# Patient Record
Sex: MALE | Race: WHITE | NOT HISPANIC OR LATINO | Employment: OTHER | URBAN - METROPOLITAN AREA
[De-identification: names, ages, dates, MRNs, and addresses within clinical notes are randomized per-mention and may not be internally consistent; named-entity substitution may affect disease eponyms.]

---

## 2019-09-25 ENCOUNTER — APPOINTMENT (EMERGENCY)
Dept: CT IMAGING | Facility: HOSPITAL | Age: 64
DRG: 206 | End: 2019-09-25
Payer: MEDICARE

## 2019-09-25 ENCOUNTER — APPOINTMENT (EMERGENCY)
Dept: RADIOLOGY | Facility: HOSPITAL | Age: 64
DRG: 206 | End: 2019-09-25
Payer: MEDICARE

## 2019-09-25 ENCOUNTER — HOSPITAL ENCOUNTER (INPATIENT)
Facility: HOSPITAL | Age: 64
LOS: 1 days | Discharge: HOME | DRG: 206 | End: 2019-09-27
Attending: STUDENT IN AN ORGANIZED HEALTH CARE EDUCATION/TRAINING PROGRAM | Admitting: STUDENT IN AN ORGANIZED HEALTH CARE EDUCATION/TRAINING PROGRAM
Payer: MEDICARE

## 2019-09-25 DIAGNOSIS — I10 HTN (HYPERTENSION): ICD-10-CM

## 2019-09-25 DIAGNOSIS — R07.9 CHEST PAIN, UNSPECIFIED TYPE: ICD-10-CM

## 2019-09-25 DIAGNOSIS — R07.89 ATYPICAL CHEST PAIN: Primary | ICD-10-CM

## 2019-09-25 PROBLEM — E87.6 HYPOKALEMIA: Status: ACTIVE | Noted: 2019-09-25

## 2019-09-25 LAB
ALBUMIN SERPL BCP-MCNC: 4.1 G/DL (ref 3.5–5)
ALP SERPL-CCNC: 56 U/L (ref 46–116)
ALT SERPL W P-5'-P-CCNC: 22 U/L (ref 12–78)
ANION GAP SERPL CALCULATED.3IONS-SCNC: 11 MMOL/L (ref 4–13)
AST SERPL W P-5'-P-CCNC: 18 U/L (ref 5–45)
BASOPHILS # BLD AUTO: 0.03 THOUSANDS/ΜL (ref 0–0.1)
BASOPHILS NFR BLD AUTO: 1 % (ref 0–1)
BILIRUB SERPL-MCNC: 0.7 MG/DL (ref 0.2–1)
BUN SERPL-MCNC: 23 MG/DL (ref 5–25)
CALCIUM SERPL-MCNC: 9 MG/DL (ref 8.3–10.1)
CHLORIDE SERPL-SCNC: 104 MMOL/L (ref 100–108)
CO2 SERPL-SCNC: 25 MMOL/L (ref 21–32)
CREAT SERPL-MCNC: 1.09 MG/DL (ref 0.6–1.3)
EOSINOPHIL # BLD AUTO: 0.09 THOUSAND/ΜL (ref 0–0.61)
EOSINOPHIL NFR BLD AUTO: 1 % (ref 0–6)
ERYTHROCYTE [DISTWIDTH] IN BLOOD BY AUTOMATED COUNT: 12.4 % (ref 11.6–15.1)
GFR SERPL CREATININE-BSD FRML MDRD: 71 ML/MIN/1.73SQ M
GLUCOSE SERPL-MCNC: 111 MG/DL (ref 65–140)
HCT VFR BLD AUTO: 46.8 % (ref 36.5–49.3)
HGB BLD-MCNC: 16.4 G/DL (ref 12–17)
IMM GRANULOCYTES # BLD AUTO: 0.03 THOUSAND/UL (ref 0–0.2)
IMM GRANULOCYTES NFR BLD AUTO: 1 % (ref 0–2)
LYMPHOCYTES # BLD AUTO: 1.56 THOUSANDS/ΜL (ref 0.6–4.47)
LYMPHOCYTES NFR BLD AUTO: 24 % (ref 14–44)
MCH RBC QN AUTO: 30.8 PG (ref 26.8–34.3)
MCHC RBC AUTO-ENTMCNC: 35 G/DL (ref 31.4–37.4)
MCV RBC AUTO: 88 FL (ref 82–98)
MONOCYTES # BLD AUTO: 0.83 THOUSAND/ΜL (ref 0.17–1.22)
MONOCYTES NFR BLD AUTO: 13 % (ref 4–12)
NEUTROPHILS # BLD AUTO: 4.07 THOUSANDS/ΜL (ref 1.85–7.62)
NEUTS SEG NFR BLD AUTO: 60 % (ref 43–75)
NRBC BLD AUTO-RTO: 0 /100 WBCS
PLATELET # BLD AUTO: 164 THOUSANDS/UL (ref 149–390)
PMV BLD AUTO: 9.6 FL (ref 8.9–12.7)
POTASSIUM SERPL-SCNC: 3.4 MMOL/L (ref 3.5–5.3)
PROT SERPL-MCNC: 8.1 G/DL (ref 6.4–8.2)
RBC # BLD AUTO: 5.32 MILLION/UL (ref 3.88–5.62)
SODIUM SERPL-SCNC: 140 MMOL/L (ref 136–145)
TROPONIN I SERPL-MCNC: <0.02 NG/ML
TROPONIN I SERPL-MCNC: <0.02 NG/ML
WBC # BLD AUTO: 6.61 THOUSAND/UL (ref 4.31–10.16)

## 2019-09-25 PROCEDURE — 99285 EMERGENCY DEPT VISIT HI MDM: CPT

## 2019-09-25 PROCEDURE — 85025 COMPLETE CBC W/AUTO DIFF WBC: CPT | Performed by: NURSE PRACTITIONER

## 2019-09-25 PROCEDURE — 93005 ELECTROCARDIOGRAM TRACING: CPT

## 2019-09-25 PROCEDURE — 80053 COMPREHEN METABOLIC PANEL: CPT | Performed by: NURSE PRACTITIONER

## 2019-09-25 PROCEDURE — 74174 CTA ABD&PLVS W/CONTRAST: CPT

## 2019-09-25 PROCEDURE — 83036 HEMOGLOBIN GLYCOSYLATED A1C: CPT | Performed by: NURSE PRACTITIONER

## 2019-09-25 PROCEDURE — 1124F ACP DISCUSS-NO DSCNMKR DOCD: CPT | Performed by: INTERNAL MEDICINE

## 2019-09-25 PROCEDURE — 84484 ASSAY OF TROPONIN QUANT: CPT | Performed by: NURSE PRACTITIONER

## 2019-09-25 PROCEDURE — 36415 COLL VENOUS BLD VENIPUNCTURE: CPT | Performed by: NURSE PRACTITIONER

## 2019-09-25 PROCEDURE — 99285 EMERGENCY DEPT VISIT HI MDM: CPT | Performed by: NURSE PRACTITIONER

## 2019-09-25 PROCEDURE — 71275 CT ANGIOGRAPHY CHEST: CPT

## 2019-09-25 PROCEDURE — 99220 PR INITIAL OBSERVATION CARE/DAY 70 MINUTES: CPT | Performed by: STUDENT IN AN ORGANIZED HEALTH CARE EDUCATION/TRAINING PROGRAM

## 2019-09-25 PROCEDURE — 71046 X-RAY EXAM CHEST 2 VIEWS: CPT

## 2019-09-25 RX ORDER — NITROGLYCERIN 0.4 MG/1
0.4 TABLET SUBLINGUAL
Status: DISCONTINUED | OUTPATIENT
Start: 2019-09-25 | End: 2019-09-27 | Stop reason: HOSPADM

## 2019-09-25 RX ORDER — NAPROXEN 250 MG/1
500 TABLET ORAL 2 TIMES DAILY PRN
Status: DISCONTINUED | OUTPATIENT
Start: 2019-09-25 | End: 2019-09-27 | Stop reason: HOSPADM

## 2019-09-25 RX ORDER — AMLODIPINE BESYLATE 5 MG/1
5 TABLET ORAL 2 TIMES DAILY
Status: DISCONTINUED | OUTPATIENT
Start: 2019-09-25 | End: 2019-09-27 | Stop reason: HOSPADM

## 2019-09-25 RX ORDER — DOCUSATE SODIUM 100 MG/1
100 CAPSULE, LIQUID FILLED ORAL 2 TIMES DAILY
Status: DISCONTINUED | OUTPATIENT
Start: 2019-09-25 | End: 2019-09-27 | Stop reason: HOSPADM

## 2019-09-25 RX ORDER — ATORVASTATIN CALCIUM 40 MG/1
40 TABLET, FILM COATED ORAL EVERY EVENING
Status: DISCONTINUED | OUTPATIENT
Start: 2019-09-25 | End: 2019-09-27 | Stop reason: HOSPADM

## 2019-09-25 RX ORDER — HYDROCHLOROTHIAZIDE 12.5 MG/1
12.5 TABLET ORAL DAILY
Status: DISCONTINUED | OUTPATIENT
Start: 2019-09-26 | End: 2019-09-27 | Stop reason: HOSPADM

## 2019-09-25 RX ORDER — CALCIUM CARBONATE 200(500)MG
1000 TABLET,CHEWABLE ORAL DAILY PRN
Status: DISCONTINUED | OUTPATIENT
Start: 2019-09-25 | End: 2019-09-27 | Stop reason: HOSPADM

## 2019-09-25 RX ORDER — ONDANSETRON 2 MG/ML
4 INJECTION INTRAMUSCULAR; INTRAVENOUS EVERY 6 HOURS PRN
Status: DISCONTINUED | OUTPATIENT
Start: 2019-09-25 | End: 2019-09-27 | Stop reason: HOSPADM

## 2019-09-25 RX ORDER — ACETAMINOPHEN 325 MG/1
650 TABLET ORAL EVERY 6 HOURS PRN
Status: DISCONTINUED | OUTPATIENT
Start: 2019-09-25 | End: 2019-09-27 | Stop reason: HOSPADM

## 2019-09-25 RX ORDER — ASPIRIN 81 MG/1
81 TABLET ORAL DAILY
Status: DISCONTINUED | OUTPATIENT
Start: 2019-09-26 | End: 2019-09-25

## 2019-09-25 RX ORDER — NAPROXEN 500 MG/1
500 TABLET ORAL 2 TIMES DAILY PRN
Status: ON HOLD | COMMUNITY
End: 2019-09-27 | Stop reason: SDUPTHER

## 2019-09-25 RX ORDER — TRAMADOL HYDROCHLORIDE 50 MG/1
50 TABLET ORAL EVERY 6 HOURS PRN
Status: DISCONTINUED | OUTPATIENT
Start: 2019-09-25 | End: 2019-09-27 | Stop reason: HOSPADM

## 2019-09-25 RX ORDER — AMLODIPINE BESYLATE 5 MG/1
5 TABLET ORAL 2 TIMES DAILY
Status: ON HOLD | COMMUNITY
End: 2019-09-27 | Stop reason: SDUPTHER

## 2019-09-25 RX ORDER — ASPIRIN 81 MG/1
81 TABLET, CHEWABLE ORAL DAILY
Status: DISCONTINUED | OUTPATIENT
Start: 2019-09-26 | End: 2019-09-27 | Stop reason: HOSPADM

## 2019-09-25 RX ORDER — CARVEDILOL 6.25 MG/1
6.25 TABLET ORAL 2 TIMES DAILY WITH MEALS
Status: DISCONTINUED | OUTPATIENT
Start: 2019-09-25 | End: 2019-09-25

## 2019-09-25 RX ORDER — CARVEDILOL 6.25 MG/1
6.25 TABLET ORAL ONCE
Status: COMPLETED | OUTPATIENT
Start: 2019-09-25 | End: 2019-09-25

## 2019-09-25 RX ORDER — POTASSIUM CHLORIDE 20 MEQ/1
40 TABLET, EXTENDED RELEASE ORAL ONCE
Status: COMPLETED | OUTPATIENT
Start: 2019-09-25 | End: 2019-09-25

## 2019-09-25 RX ORDER — CARVEDILOL 6.25 MG/1
6.25 TABLET ORAL 2 TIMES DAILY WITH MEALS
Status: ON HOLD | COMMUNITY
End: 2019-09-27 | Stop reason: SDUPTHER

## 2019-09-25 RX ORDER — 0.9 % SODIUM CHLORIDE 0.9 %
3 VIAL (ML) INJECTION AS NEEDED
Status: DISCONTINUED | OUTPATIENT
Start: 2019-09-25 | End: 2019-09-27 | Stop reason: HOSPADM

## 2019-09-25 RX ORDER — HYDROCHLOROTHIAZIDE 12.5 MG/1
12.5 CAPSULE, GELATIN COATED ORAL DAILY
Status: ON HOLD | COMMUNITY
End: 2019-09-27 | Stop reason: SDUPTHER

## 2019-09-25 RX ORDER — ASPIRIN 81 MG/1
81 TABLET ORAL DAILY
COMMUNITY

## 2019-09-25 RX ORDER — TRAMADOL HYDROCHLORIDE 50 MG/1
50 TABLET ORAL EVERY 6 HOURS PRN
COMMUNITY

## 2019-09-25 RX ADMIN — DOCUSATE SODIUM 100 MG: 100 CAPSULE, LIQUID FILLED ORAL at 20:32

## 2019-09-25 RX ADMIN — METOPROLOL TARTRATE 25 MG: 25 TABLET, FILM COATED ORAL at 20:32

## 2019-09-25 RX ADMIN — POTASSIUM CHLORIDE 40 MEQ: 1500 TABLET, EXTENDED RELEASE ORAL at 20:32

## 2019-09-25 RX ADMIN — CARVEDILOL 6.25 MG: 6.25 TABLET, FILM COATED ORAL at 15:16

## 2019-09-25 RX ADMIN — IOHEXOL 100 ML: 350 INJECTION, SOLUTION INTRAVENOUS at 16:31

## 2019-09-25 RX ADMIN — ATORVASTATIN CALCIUM 40 MG: 40 TABLET, FILM COATED ORAL at 20:32

## 2019-09-25 RX ADMIN — AMLODIPINE BESYLATE 5 MG: 5 TABLET ORAL at 20:32

## 2019-09-25 RX ADMIN — TRAMADOL HYDROCHLORIDE 50 MG: 50 TABLET, FILM COATED ORAL at 20:31

## 2019-09-25 NOTE — PLAN OF CARE
Problem: PAIN - ADULT  Goal: Verbalizes/displays adequate comfort level or baseline comfort level  Description  Interventions:  - Encourage patient to monitor pain and request assistance  - Assess pain using appropriate pain scale  - Administer analgesics based on type and severity of pain and evaluate response  - Implement non-pharmacological measures as appropriate and evaluate response  - Consider cultural and social influences on pain and pain management  - Notify physician/advanced practitioner if interventions unsuccessful or patient reports new pain  Outcome: Not Progressing     Problem: INFECTION - ADULT  Goal: Absence or prevention of progression during hospitalization  Description  INTERVENTIONS:  - Assess and monitor for signs and symptoms of infection  - Monitor lab/diagnostic results  - Monitor all insertion sites, i e  indwelling lines, tubes, and drains  - Monitor endotracheal if appropriate and nasal secretions for changes in amount and color  - [place] appropriate cooling/warming therapies per order  - Administer medications as ordered  - Instruct and encourage patient and family to use good hand hygiene technique  - Identify and instruct in appropriate isolation precautions for identified infection/condition  Outcome: Not Progressing     Problem: SAFETY ADULT  Goal: Patient will remain free of falls  Description  INTERVENTIONS:  - Assess patient frequently for physical needs  -  Identify cognitive and physical deficits and behaviors that affect risk of falls    -  [place] fall precautions as indicated by assessment   - Educate patient/family on patient safety including physical limitations  - Instruct patient to call for assistance with activity based on assessment  - Modify environment to reduce risk of injury  - Consider OT/PT consult to assist with strengthening/mobility  Outcome: Not Progressing  Goal: Maintain or return to baseline ADL function  Description  INTERVENTIONS:  -  Assess patient's ability to carry out ADLs; assess patient's baseline for ADL function and identify physical deficits which impact ability to perform ADLs (bathing, care of mouth/teeth, toileting, grooming, dressing, etc )  - Assess/evaluate cause of self-care deficits   - Assess range of motion  - Assess patient's mobility; develop plan if impaired  - Assess patient's need for assistive devices and provide as appropriate  - Encourage maximum independence but intervene and supervise when necessary  - Involve family in performance of ADLs  - Assess for home care needs following discharge   - Consider OT consult to assist with ADL evaluation and planning for discharge  - Provide patient education as appropriate  Outcome: Not Progressing  Goal: Maintain or return mobility status to optimal level  Description  INTERVENTIONS:  - Assess patient's baseline mobility status (ambulation, transfers, stairs, etc )    - Identify cognitive and physical deficits and behaviors that affect mobility  - Identify mobility aids required to assist with transfers and/or ambulation (gait belt, sit-to-stand, lift, walker, cane, etc )  - [place] fall precautions as indicated by assessment  - Record patient progress and toleration of activity level on Mobility SBAR; progress patient to next Phase/Stage  - Instruct patient to call for assistance with activity based on assessment  - Consider rehabilitation consult to assist with strengthening/weightbearing, etc   Outcome: Not Progressing     Problem: DISCHARGE PLANNING  Goal: Discharge to home or other facility with appropriate resources  Description  INTERVENTIONS:  - Identify barriers to discharge w/patient and caregiver  - Arrange for needed discharge resources and transportation as appropriate  - Identify discharge learning needs (meds, wound care, etc )  - Arrange for interpretive services to assist at discharge as needed  - Refer to Case Management Department for coordinating discharge planning if the patient needs post-hospital services based on physician/advanced practitioner order or complex needs related to functional status, cognitive ability, or social support system  Outcome: Not Progressing     Problem: Knowledge Deficit  Goal: Patient/family/caregiver demonstrates understanding of disease process, treatment plan, medications, and discharge instructions  Description  Complete learning assessment and assess knowledge base    Interventions:  - Provide teaching at level of understanding  - Provide teaching via preferred learning methods  Outcome: Not Progressing

## 2019-09-25 NOTE — ASSESSMENT & PLAN NOTE
Left-sided back, arm pain and chest pressure over the last 3-4 days  Cardiology consult  Telemetry  Initial troponin negative continue to trend  EKGs with troponins  Cardiac diet  Metoprolol aspirin statin  Lipid panel hemoglobin A1c  P r n   Nitro

## 2019-09-25 NOTE — LETTER
[address] [address]  Dept: [phone]    September 27, 2019     Patient: Coral Ornelas   YOB: 1955   Date of Visit: 9/25/2019       To Whom it May Concern:    Jamar Jackson was under my professional care  He was seen in the hospital from 9/25/2019   to 09/27/19  If you have any questions or concerns, please don't hesitate to call           Sincerely,          Sonal Francisco MD

## 2019-09-25 NOTE — H&P
[facility]  H&P- Jazzytayna Duartebaldemar 1955, 71 y o  male MRN: [id number]    Unit/Bed#: -01 Encounter: [id number]    Primary Care Provider: No primary care provider on file  Date and time admitted to hospital: 9/25/2019  2:04 PM  * Chest pain  Assessment & Plan  Left-sided back, arm pain and chest pressure over the last 3-4 days  Cardiology consult  Telemetry  Initial troponin negative continue to trend  EKGs with troponins  Cardiac diet  Metoprolol aspirin statin  Lipid panel hemoglobin A1c  P r n  Nitro    Hypokalemia  Assessment & Plan  3 4 on admission, mild  Oral repletion  BMP in a m  VTE Prophylaxis: Enoxaparin (Lovenox)  / sequential compression device   Code Status:  Full code  POLST: POLST form is not discussed and not completed at this time  Discussion with family:  Not available    Anticipated Length of Stay:  Patient will be admitted on an Observation basis with an anticipated length of stay of  < 2 midnights  Justification for Hospital Stay:  Evaluation of chest pain, echocardiogram, trend troponins    Total Time for Visit, including Counseling / Coordination of Care: 1 hour  Greater than 50% of this total time spent on direct patient counseling and coordination of care  Chief Complaint:   Chest pain    History of Present Illness:     hest pain  Patient reports chest pressor with left back and left arm pain over the last 3-4 days  He reports nausea and diaphoresis associated with the pain as well shortness of breath  He states that he had open-heart surgery approximately 10 years ago at [facility]   He sees a cardiologist every 6 months and gets an echocardiogram he believes yearly however he reports that he has not had 1 in at least a year  He denies any alcohol cigarettes or drug use  He ambulates with cane  He does report that he missed his carvedilol for the last    Review of Systems:    Review of Systems   Constitutional: Negative      HENT: Negative  Respiratory: Positive for shortness of breath  Cardiovascular: Positive for chest pain  Gastrointestinal: Positive for nausea  Negative for abdominal pain and vomiting  Genitourinary: Negative  Musculoskeletal: Negative  Neurological: Negative  Hematological: Negative  Psychiatric/Behavioral: Negative  All other systems reviewed and are negative  Past Medical and Surgical History:     No past medical history on file  No past surgical history on file  Meds/Allergies:    Prior to Admission medications    Medication Sig Start Date End Date Taking? Authorizing Provider   amLODIPine (NORVASC) 5 mg tablet Take 5 mg by mouth 2 (two) times a day   Yes Historical Provider, MD   aspirin (ECOTRIN LOW STRENGTH) 81 mg EC tablet Take 81 mg by mouth daily   Yes Historical Provider, MD   carvedilol (COREG) 6 25 mg tablet Take 6 25 mg by mouth 2 (two) times a day with meals   Yes Historical Provider, MD   hydrochlorothiazide (MICROZIDE) 12 5 mg capsule Take 12 5 mg by mouth daily   Yes Historical Provider, MD   naproxen (NAPROSYN) 500 mg tablet Take 500 mg by mouth 2 (two) times a day as needed for mild pain   Yes Historical Provider, MD   traMADol (ULTRAM) 50 mg tablet Take 50 mg by mouth every 6 (six) hours as needed for moderate pain   Yes Historical Provider, MD     I have reviewed home medications with patient personally  Allergies:    Allergies   Allergen Reactions    Penicillins Anxiety       Social History:     Marital Status: /Civil Union   Occupation:  NA  Patient Pre-hospital Living Situation:  Private residence  Patient Pre-hospital Level of Mobility:  Independent with the use of cane  Patient Pre-hospital Diet Restrictions:  None  Substance Use History:   Social History     Substance and Sexual Activity   Alcohol Use Never    Frequency: Never    Binge frequency: Never     Social History     Tobacco Use   Smoking Status Not on file     Social History     Substance and Sexual Activity   Drug Use Not on file       Family History:    No family history on file  Physical Exam:     Vitals:   Blood Pressure: 130/58 (09/25/19 1745)  Pulse: 60 (09/25/19 1745)  Temperature: 97 8 °F (36 6 °C) (09/25/19 1357)  Temp Source: Oral (09/25/19 1357)  Respirations: 15 (09/25/19 1745)  Height: 5' 7" (170 2 cm) (09/25/19 1357)  Weight - Scale: 104 kg (229 lb 15 oz) (09/25/19 1357)  SpO2: 96 % (09/25/19 1745)    Physical Exam   Constitutional: He is oriented to person, place, and time  He appears well-developed and well-nourished  He appears distressed  HENT:   Head: Normocephalic  Eyes: Pupils are equal, round, and reactive to light  Neck: Normal range of motion  Neck supple  Cardiovascular: Normal rate and regular rhythm  Pulmonary/Chest: Effort normal    Abdominal: Soft  Bowel sounds are normal  He exhibits no distension  Musculoskeletal: Normal range of motion  He exhibits no tenderness  Neurological: He is alert and oriented to person, place, and time  Skin: Skin is warm  He is diaphoretic  No pallor  Psychiatric: He has a normal mood and affect  His behavior is normal  Judgment and thought content normal    Nursing note and vitals reviewed  Additional Data:     Lab Results: I have personally reviewed pertinent reports  Results from last 7 days   Lab Units 09/25/19  1437   WBC Thousand/uL 6 61   HEMOGLOBIN g/dL 16 4   HEMATOCRIT % 46 8   PLATELETS Thousands/uL 164   NEUTROS PCT % 60   LYMPHS PCT % 24   MONOS PCT % 13*   EOS PCT % 1     Results from last 7 days   Lab Units 09/25/19  1437   SODIUM mmol/L 140   POTASSIUM mmol/L 3 4*   CHLORIDE mmol/L 104   CO2 mmol/L 25   BUN mg/dL 23   CREATININE mg/dL 1 09   ANION GAP mmol/L 11   CALCIUM mg/dL 9 0   ALBUMIN g/dL 4 1   TOTAL BILIRUBIN mg/dL 0 70   ALK PHOS U/L 56   ALT U/L 22   AST U/L 18   GLUCOSE RANDOM mg/dL 111                       Imaging: I have personally reviewed pertinent reports        CTA dissection protocol chest abdomen pelvis w wo contrast   Final Result by Aimee Ashraf MD (09/25 5748)      1  No evidence for aortic dissection or aneurysm  2   Hepatomegaly  Cholelithiasis  3   Mild hydronephrosis on the left  Slight fullness of the collecting system on the right  This is probably related to the distended urinary bladder  4    2 9 x 1 0 cm ovoid soft tissue density just posterior to the umbilical region on the right, image 180 series 3  Question related to prior surgery  If there is no history of surgery, consider follow-up CT examination in 6-12 months to ensure    stability  Workstation performed: [id number]         X-ray chest 2 views   ED Interpretation by TASNEEM Souza (09/25 1443)   No acute cardiopulmonary findings      Final Result by Elida Guardado MD (09/25 1530)      No acute cardiopulmonary disease  Workstation performed: [id number]             EKG, Pathology, and Other Studies Reviewed on Admission:   · EKG:  Not available    Allscripts / Epic Records Reviewed: Yes     ** Please Note: This note has been constructed using a voice recognition system   **

## 2019-09-25 NOTE — ED PROVIDER NOTES
History  Chief Complaint   Patient presents with    Back Pain     pt c/o upper back pain that radiates to left arm and to chest "for the past couple weeks" pt has hx of MI, open heart surgery, HTN    Chest Pain     pt denies chest pain at this time  pt states "it is intermittent radiating to my chest from my back"     This is a 43-year-old male patient presents here for chief complaint of upper back pain that radiates to his left arm and also to his chest   He reports he has had the symptoms for about the last week  The upper back pain is constant the chest pain has been intermittent  Reports an episode on Monday that lasted a few minutes while he was at work and then he rested and it resolved  He attributes this to his high blood pressure and lack of Coreg  he Reports he ran out of the medication and has tried to contact his cardiologist who was unavailable from the [place] area although he lives here  He has had a few other random episodes not related to exertion since that time  Episodes only last a few minutes  Denies any nausea vomiting  He is sweaty at times  Wakes up drenched reportedly  Denies any nausea vomiting  No dizziness  Prior to Admission Medications   Prescriptions Last Dose Informant Patient Reported? Taking?    amLODIPine (NORVASC) 5 mg tablet 9/25/2019 at Unknown time  Yes Yes   Sig: Take 5 mg by mouth 2 (two) times a day   aspirin (ECOTRIN LOW STRENGTH) 81 mg EC tablet 9/25/2019 at Unknown time Self Yes Yes   Sig: Take 81 mg by mouth daily   carvedilol (COREG) 6 25 mg tablet Past Week at Unknown time Self Yes Yes   Sig: Take 6 25 mg by mouth 2 (two) times a day with meals   hydrochlorothiazide (MICROZIDE) 12 5 mg capsule 9/25/2019 at Unknown time Self Yes Yes   Sig: Take 12 5 mg by mouth daily   naproxen (NAPROSYN) 500 mg tablet Past Month at Unknown time Self Yes Yes   Sig: Take 500 mg by mouth 2 (two) times a day as needed for mild pain   traMADol (ULTRAM) 50 mg tablet Past Month at Unknown time  Yes Yes   Sig: Take 50 mg by mouth every 6 (six) hours as needed for moderate pain      Facility-Administered Medications: None       No past medical history on file  No past surgical history on file  No family history on file  I have reviewed and agree with the history as documented  Social History     Tobacco Use    Smoking status: Not on file   Substance Use Topics    Alcohol use: Never     Frequency: Never     Binge frequency: Never    Drug use: Not on file        Review of Systems   Constitutional: Negative for diaphoresis, fatigue and fever  HENT: Negative for congestion, ear pain, nosebleeds and sore throat  Eyes: Negative for photophobia, pain, discharge and visual disturbance  Respiratory: Negative for cough, choking, chest tightness, shortness of breath and wheezing  Cardiovascular: Positive for chest pain  Negative for palpitations  Gastrointestinal: Negative for abdominal distention, abdominal pain, diarrhea and vomiting  Genitourinary: Negative for dysuria, flank pain and frequency  Musculoskeletal: Positive for back pain  Negative for gait problem and joint swelling  Skin: Negative for color change and rash  Neurological: Negative for dizziness, syncope and headaches  Psychiatric/Behavioral: Negative for behavioral problems and confusion  The patient is not nervous/anxious  All other systems reviewed and are negative  Physical Exam  Physical Exam   Constitutional: He is oriented to person, place, and time  He appears well-developed and well-nourished  HENT:   Head: Normocephalic and atraumatic  Eyes: Pupils are equal, round, and reactive to light  Neck: Normal range of motion  Neck supple  Cardiovascular: Normal rate, regular rhythm, normal heart sounds and normal pulses  PMI is not displaced  Pulmonary/Chest: Effort normal and breath sounds normal  No respiratory distress  Abdominal: Soft  He exhibits no distension   There is no guarding  Musculoskeletal: Normal range of motion  Lymphadenopathy:     He has no cervical adenopathy  Neurological: He is alert and oriented to person, place, and time  Skin: Skin is warm and dry  No rash noted  He is not diaphoretic  No pallor  Psychiatric: He has a normal mood and affect  Vitals reviewed        Vital Signs  ED Triage Vitals [09/25/19 1357]   Temperature Pulse Respirations Blood Pressure SpO2   97 8 °F (36 6 °C) 74 17 (!) 193/117 98 %      Temp Source Heart Rate Source Patient Position - Orthostatic VS BP Location FiO2 (%)   Oral Monitor Sitting Left arm --      Pain Score       7           Vitals:    09/25/19 1530 09/25/19 1600 09/25/19 1745 09/25/19 1926   BP: 150/93 141/95 130/58 153/95   Pulse: 62 61 60 58   Patient Position - Orthostatic VS: Sitting Sitting Lying          Visual Acuity      ED Medications  Medications   sodium chloride (PF) 0 9 % injection 3 mL (has no administration in time range)   amLODIPine (NORVASC) tablet 5 mg (5 mg Oral Given 9/25/19 2032)   hydrochlorothiazide (HYDRODIURIL) tablet 12 5 mg (has no administration in time range)   traMADol (ULTRAM) tablet 50 mg (50 mg Oral Given 9/25/19 2031)   naproxen (NAPROSYN) tablet 500 mg (has no administration in time range)   docusate sodium (COLACE) capsule 100 mg (100 mg Oral Given 9/25/19 2032)   ondansetron (ZOFRAN) injection 4 mg (has no administration in time range)   calcium carbonate (TUMS) chewable tablet 1,000 mg (has no administration in time range)   atorvastatin (LIPITOR) tablet 40 mg (40 mg Oral Given 9/25/19 2032)   nitroglycerin (NITROSTAT) SL tablet 0 4 mg (has no administration in time range)   aspirin chewable tablet 81 mg (has no administration in time range)   enoxaparin (LOVENOX) subcutaneous injection 40 mg (has no administration in time range)   acetaminophen (TYLENOL) tablet 650 mg (has no administration in time range)   metoprolol tartrate (LOPRESSOR) tablet 25 mg (25 mg Oral Given 9/25/19 2032)   carvedilol (COREG) tablet 6 25 mg (6 25 mg Oral Given 9/25/19 1516)   iohexol (OMNIPAQUE) 350 MG/ML injection (MULTI-DOSE) 100 mL (100 mL Intravenous Given 9/25/19 1631)   potassium chloride (K-DUR,KLOR-CON) CR tablet 40 mEq (40 mEq Oral Given 9/25/19 2032)       Diagnostic Studies  Results Reviewed     Procedure Component Value Units Date/Time    Troponin I [[id number]]  (Normal) Collected:  09/25/19 2114    Lab Status:  Final result Specimen:  Blood from Arm, Left Updated:  09/25/19 2150     Troponin I <0 02 ng/mL     Hemoglobin A1C w/ EAG Estimation [[id number]] Collected:  09/25/19 1437    Lab Status:   In process Specimen:  Blood from Arm, Right Updated:  09/25/19 2044    Troponin I [[id number]]     Lab Status:  No result Specimen:  Blood     Comprehensive metabolic panel [[id number]]  (Abnormal) Collected:  09/25/19 1437    Lab Status:  Final result Specimen:  Blood from Arm, Right Updated:  09/25/19 1507     Sodium 140 mmol/L      Potassium 3 4 mmol/L      Chloride 104 mmol/L      CO2 25 mmol/L      ANION GAP 11 mmol/L      BUN 23 mg/dL      Creatinine 1 09 mg/dL      Glucose 111 mg/dL      Calcium 9 0 mg/dL      AST 18 U/L      ALT 22 U/L      Alkaline Phosphatase 56 U/L      Total Protein 8 1 g/dL      Albumin 4 1 g/dL      Total Bilirubin 0 70 mg/dL      eGFR 71 ml/min/1 73sq m     Narrative:       [place] guidelines for Chronic Kidney Disease (CKD):     Stage 1 with normal or high GFR (GFR > 90 mL/min/1 73 square meters)    Stage 2 Mild CKD (GFR = 60-89 mL/min/1 73 square meters)    Stage 3A Moderate CKD (GFR = 45-59 mL/min/1 73 square meters)    Stage 3B Moderate CKD (GFR = 30-44 mL/min/1 73 square meters)    Stage 4 Severe CKD (GFR = 15-29 mL/min/1 73 square meters)    Stage 5 End Stage CKD (GFR <15 mL/min/1 73 square meters)  Note: GFR calculation is accurate only with a steady state creatinine    Troponin I [[id number]]  (Normal) Collected:  09/25/19 1437    Lab Status:  Final result Specimen:  Blood from Arm, Right Updated:  09/25/19 1505     Troponin I <0 02 ng/mL     CBC and differential [[id number]]  (Abnormal) Collected:  09/25/19 1437    Lab Status:  Final result Specimen:  Blood from Arm, Right Updated:  09/25/19 1444     WBC 6 61 Thousand/uL      RBC 5 32 Million/uL      Hemoglobin 16 4 g/dL      Hematocrit 46 8 %      MCV 88 fL      MCH 30 8 pg      MCHC 35 0 g/dL      RDW 12 4 %      MPV 9 6 fL      Platelets 130 Thousands/uL      nRBC 0 /100 WBCs      Neutrophils Relative 60 %      Immat GRANS % 1 %      Lymphocytes Relative 24 %      Monocytes Relative 13 %      Eosinophils Relative 1 %      Basophils Relative 1 %      Neutrophils Absolute 4 07 Thousands/µL      Immature Grans Absolute 0 03 Thousand/uL      Lymphocytes Absolute 1 56 Thousands/µL      Monocytes Absolute 0 83 Thousand/µL      Eosinophils Absolute 0 09 Thousand/µL      Basophils Absolute 0 03 Thousands/µL                  CTA dissection protocol chest abdomen pelvis w wo contrast   Final Result by Damian Lopez MD (09/25 0128)      1  No evidence for aortic dissection or aneurysm  2   Hepatomegaly  Cholelithiasis  3   Mild hydronephrosis on the left  Slight fullness of the collecting system on the right  This is probably related to the distended urinary bladder  4    2 9 x 1 0 cm ovoid soft tissue density just posterior to the umbilical region on the right, image 180 series 3  Question related to prior surgery  If there is no history of surgery, consider follow-up CT examination in 6-12 months to ensure    stability  Workstation performed: [id number]         X-ray chest 2 views   ED Interpretation by TASNEEM Albarran (09/25 1446)   No acute cardiopulmonary findings      Final Result by Erik Nieves MD (09/25 1530)      No acute cardiopulmonary disease        Workstation performed: [id number]                    Procedures  ECG 12 Lead Documentation Only  Date/Time: 9/25/2019 2:45 PM  Performed by: TASNEEM Ortega  Authorized by: TASNEEM Ortega     Indications / Diagnosis:  SR  ECG reviewed by me, the ED Provider: yes    Patient location:  ED  Previous ECG:     Previous ECG:  Unavailable  Interpretation:     Interpretation: normal    Rate:     ECG rate:  69    ECG rate assessment: normal    Rhythm:     Rhythm: sinus rhythm    Ectopy:     Ectopy: none    QRS:     QRS axis:  Normal  Conduction:     Conduction: normal    ST segments:     ST segments:  Normal  T waves:     T waves: normal             ED Course         HEART Risk Score      Most Recent Value   History  2 Filed at: 09/25/2019 1543   ECG  1 Filed at: 09/25/2019 1543   Age  2 Filed at: 09/25/2019 1543   Risk Factors  2 Filed at: 09/25/2019 1543   Troponin  0 Filed at: 09/25/2019 1543   Heart Score Risk Calculator   History  2 Filed at: 09/25/2019 1543   ECG  1 Filed at: 09/25/2019 1543   Age  2 Filed at: 09/25/2019 1543   Risk Factors  2 Filed at: 09/25/2019 1543   Troponin  0 Filed at: 09/25/2019 1543   HEART Score  7 Filed at: 09/25/2019 1543   HEART Score  7 Filed at: 09/25/2019 1543        Identification of Seniors at Risk      Most Recent Value   (ISAR) Identification of Seniors at Risk   Before the illness or injury that brought you to the Emergency, did you need someone to help you on a regular basis? 0 Filed at: 09/25/2019 1400   In the last 24 hours, have you needed more help than usual?  0 Filed at: 09/25/2019 1400   Have you been hospitalized for one or more nights during the past 6 months? 0 Filed at: 09/25/2019 1400   In general, do you see well?  0 Filed at: 09/25/2019 1400   In general, do you have serious problems with your memory? 0 Filed at: 09/25/2019 1400   Do you take more than three different medications every day?   1 Filed at: 09/25/2019 1400   ISAR Score  1 Filed at: 09/25/2019 1400                  VARUN Risk Score      Most Recent Value   Age >= 72  1 Filed at: 09/25/2019 1933   Known CAD (stenosis >= 50%)  0 Filed at: 09/25/2019 1933   Recent (<=24 hrs) Service Angina  1 Filed at: 09/25/2019 1933   ST Deviation >= 0 5 mm  0 Filed at: 09/25/2019 1933   3+ CAD Risk Factors (FHx, HTN, HLP, DM, Smoker)  0 Filed at: 09/25/2019 1933   Aspirin Use Past 7 Days  0 Filed at: 09/25/2019 1933   Elevated Cardiac Markers  0 Filed at: 09/25/2019 1933   VARUN Risk Score (Calculated)  2 Filed at: 09/25/2019 1933              MDM  Number of Diagnoses or Management Options  Atypical chest pain: new and requires workup  HTN (hypertension): new and requires workup  Diagnosis management comments: Concerning history, heart score moderate severe risk  Will bring in for cardiac ups       Amount and/or Complexity of Data Reviewed  Clinical lab tests: reviewed and ordered  Tests in the radiology section of CPT®: reviewed and ordered  Tests in the medicine section of CPT®: reviewed and ordered  Review and summarize past medical records: yes  Discuss the patient with other providers: yes  Independent visualization of images, tracings, or specimens: yes    Patient Progress  Patient progress: stable      Disposition  Final diagnoses:   Atypical chest pain   HTN (hypertension)     Time reflects when diagnosis was documented in both MDM as applicable and the Disposition within this note     Time User Action Codes Description Comment    9/25/2019  3:44 PM Jose Heller Add [R07 89] Atypical chest pain     9/25/2019  3:44 PM Jose Heller Add [I10] HTN (hypertension)       ED Disposition     ED Disposition Condition Date/Time Comment    Admit Stable Wed Sep 25, 2019  5:57 PM Case was discussed with Annabelle Gomez and the patient's admission status was agreed to be Admission Status: observation status to the service of Dr Rajiv Shearer           Follow-up Information    None         Current Discharge Medication List      CONTINUE these medications which have NOT CHANGED    Details   amLODIPine (NORVASC) 5 mg tablet Take 5 mg by mouth 2 (two) times a day      aspirin (ECOTRIN LOW STRENGTH) 81 mg EC tablet Take 81 mg by mouth daily      carvedilol (COREG) 6 25 mg tablet Take 6 25 mg by mouth 2 (two) times a day with meals      hydrochlorothiazide (MICROZIDE) 12 5 mg capsule Take 12 5 mg by mouth daily      naproxen (NAPROSYN) 500 mg tablet Take 500 mg by mouth 2 (two) times a day as needed for mild pain      traMADol (ULTRAM) 50 mg tablet Take 50 mg by mouth every 6 (six) hours as needed for moderate pain           No discharge procedures on file      ED Provider  Electronically Signed by           TASNEEM Jose  09/25/19 4393

## 2019-09-25 NOTE — ED NOTES
1  CC-back pain, chest pain    2  Is this admission due to an injury?-no    3  Orientation status A&Ox3    4  Abnormal labs/ focused assessment/ vitals-none    5  Medications/ drips-coreg given in ED    6  Narcotic time/ pain medications-none    7  IV lines/drains/ etc  18G rt ac    8  Isolation status-none    9  Skin-intact    10  Ambulation status-independant    11   ED phone number [phone]     Win Arthur, RN  09/25/19 4568

## 2019-09-26 ENCOUNTER — APPOINTMENT (OUTPATIENT)
Dept: NON INVASIVE DIAGNOSTICS | Facility: HOSPITAL | Age: 64
DRG: 206 | End: 2019-09-26
Payer: MEDICARE

## 2019-09-26 PROBLEM — I10 HTN (HYPERTENSION): Status: ACTIVE | Noted: 2019-09-26

## 2019-09-26 LAB
ANION GAP SERPL CALCULATED.3IONS-SCNC: 10 MMOL/L (ref 4–13)
ATRIAL RATE: 53 BPM
ATRIAL RATE: 56 BPM
BUN SERPL-MCNC: 20 MG/DL (ref 5–25)
CALCIUM SERPL-MCNC: 8.5 MG/DL (ref 8.3–10.1)
CHLORIDE SERPL-SCNC: 104 MMOL/L (ref 100–108)
CHOLEST SERPL-MCNC: 167 MG/DL (ref 50–200)
CO2 SERPL-SCNC: 25 MMOL/L (ref 21–32)
CREAT SERPL-MCNC: 0.95 MG/DL (ref 0.6–1.3)
ERYTHROCYTE [DISTWIDTH] IN BLOOD BY AUTOMATED COUNT: 12.6 % (ref 11.6–15.1)
EST. AVERAGE GLUCOSE BLD GHB EST-MCNC: 117 MG/DL
GFR SERPL CREATININE-BSD FRML MDRD: 84 ML/MIN/1.73SQ M
GLUCOSE SERPL-MCNC: 106 MG/DL (ref 65–140)
HBA1C MFR BLD: 5.7 % (ref 4.2–6.3)
HCT VFR BLD AUTO: 44.4 % (ref 36.5–49.3)
HDLC SERPL-MCNC: 26 MG/DL (ref 40–60)
HGB BLD-MCNC: 15.2 G/DL (ref 12–17)
LDLC SERPL CALC-MCNC: 116 MG/DL (ref 0–100)
MAGNESIUM SERPL-MCNC: 1.9 MG/DL (ref 1.6–2.6)
MCH RBC QN AUTO: 30.6 PG (ref 26.8–34.3)
MCHC RBC AUTO-ENTMCNC: 34.2 G/DL (ref 31.4–37.4)
MCV RBC AUTO: 89 FL (ref 82–98)
NONHDLC SERPL-MCNC: 141 MG/DL
P AXIS: 10 DEGREES
P AXIS: 52 DEGREES
PHOSPHATE SERPL-MCNC: 3.8 MG/DL (ref 2.3–4.1)
PLATELET # BLD AUTO: 152 THOUSANDS/UL (ref 149–390)
PMV BLD AUTO: 9.5 FL (ref 8.9–12.7)
POTASSIUM SERPL-SCNC: 3.8 MMOL/L (ref 3.5–5.3)
PR INTERVAL: 204 MS
PR INTERVAL: 204 MS
QRS AXIS: -17 DEGREES
QRS AXIS: -7 DEGREES
QRSD INTERVAL: 100 MS
QRSD INTERVAL: 108 MS
QT INTERVAL: 444 MS
QT INTERVAL: 452 MS
QTC INTERVAL: 424 MS
QTC INTERVAL: 428 MS
RBC # BLD AUTO: 4.97 MILLION/UL (ref 3.88–5.62)
SODIUM SERPL-SCNC: 139 MMOL/L (ref 136–145)
T WAVE AXIS: -10 DEGREES
T WAVE AXIS: -3 DEGREES
TRIGL SERPL-MCNC: 124 MG/DL
TROPONIN I SERPL-MCNC: <0.02 NG/ML
VENTRICULAR RATE: 53 BPM
VENTRICULAR RATE: 56 BPM
WBC # BLD AUTO: 6.72 THOUSAND/UL (ref 4.31–10.16)

## 2019-09-26 PROCEDURE — 83735 ASSAY OF MAGNESIUM: CPT | Performed by: NURSE PRACTITIONER

## 2019-09-26 PROCEDURE — 84100 ASSAY OF PHOSPHORUS: CPT | Performed by: NURSE PRACTITIONER

## 2019-09-26 PROCEDURE — 85027 COMPLETE CBC AUTOMATED: CPT | Performed by: NURSE PRACTITIONER

## 2019-09-26 PROCEDURE — 93306 TTE W/DOPPLER COMPLETE: CPT | Performed by: INTERNAL MEDICINE

## 2019-09-26 PROCEDURE — 93306 TTE W/DOPPLER COMPLETE: CPT

## 2019-09-26 PROCEDURE — 93010 ELECTROCARDIOGRAM REPORT: CPT | Performed by: INTERNAL MEDICINE

## 2019-09-26 PROCEDURE — 99232 SBSQ HOSP IP/OBS MODERATE 35: CPT | Performed by: STUDENT IN AN ORGANIZED HEALTH CARE EDUCATION/TRAINING PROGRAM

## 2019-09-26 PROCEDURE — 99221 1ST HOSP IP/OBS SF/LOW 40: CPT | Performed by: INTERNAL MEDICINE

## 2019-09-26 PROCEDURE — 80061 LIPID PANEL: CPT | Performed by: NURSE PRACTITIONER

## 2019-09-26 PROCEDURE — 80048 BASIC METABOLIC PNL TOTAL CA: CPT | Performed by: NURSE PRACTITIONER

## 2019-09-26 RX ADMIN — DOCUSATE SODIUM 100 MG: 100 CAPSULE, LIQUID FILLED ORAL at 08:48

## 2019-09-26 RX ADMIN — DOCUSATE SODIUM 100 MG: 100 CAPSULE, LIQUID FILLED ORAL at 17:00

## 2019-09-26 RX ADMIN — METOPROLOL TARTRATE 25 MG: 25 TABLET, FILM COATED ORAL at 21:11

## 2019-09-26 RX ADMIN — ENOXAPARIN SODIUM 40 MG: 40 INJECTION SUBCUTANEOUS at 08:48

## 2019-09-26 RX ADMIN — METOPROLOL TARTRATE 25 MG: 25 TABLET, FILM COATED ORAL at 08:48

## 2019-09-26 RX ADMIN — AMLODIPINE BESYLATE 5 MG: 5 TABLET ORAL at 17:00

## 2019-09-26 RX ADMIN — AMLODIPINE BESYLATE 5 MG: 5 TABLET ORAL at 08:48

## 2019-09-26 RX ADMIN — NAPROXEN 500 MG: 250 TABLET ORAL at 21:13

## 2019-09-26 RX ADMIN — HYDROCHLOROTHIAZIDE 12.5 MG: 12.5 TABLET ORAL at 08:48

## 2019-09-26 RX ADMIN — ATORVASTATIN CALCIUM 40 MG: 40 TABLET, FILM COATED ORAL at 17:00

## 2019-09-26 RX ADMIN — ASPIRIN 81 MG 81 MG: 81 TABLET ORAL at 08:48

## 2019-09-26 NOTE — CONSULTS
Consultation - Cardiology   Griselda Killings 71 y o  male MRN: [id number]  Unit/Bed#: -01 Encounter: [id number]  09/26/19  9:02 AM    Assessment/ Plan:  1- Chest pain, likely atypical  troponins negative x3  EKG did show new nonspecific T-wave abnormalities  Will arrange for inpatient pharmacologic stress test tomorrow to rule out ischemia  TTE shows EF 60% without regional wall motion abnormalities, mild LVH, moderate to markedly dilated RA and RV, markedly dilated LA, trace TR, mild aortic root dilation  2- Hypertension, restarted on home dose Coreg 6 25 mg BID, hydrochlorothiazide 12 5 mg daily and Norvasc 5 mg daily  Blood pressure is acceptable  Continue to monitor  History of Present Illness   Physician Requesting Consult: Stephen Eisenmenger, MD  Reason for Consult / Principal Problem:  Chest pain  HPI: Griselda Killings is a 71y o  year old male who presents with stabbing left shoulder pain radiating to left upper back and left-sided chest  Patient states that he exacerbates chest pain with left shoulder motion and movement  Patient reports feeling hot and flushed sensation associated with high blood pressures which prompted his arrival to the ED  States that his blood pressures were also elevated for several days prior to admission secondary to inability to refill his Coreg prescription because he could not get a hold of his cardiologist  States tramadol, naproxen and aspirin were not helping his chest pain  Does not get worse with exertion  He does have cardiac history to include "triple valve repair" in 2009 at [facility]  He follows up with cardiologist Dr Mikhail Martino in [facility]  His troponins were negative x3, EKG was without ischemic changes, CT chest was without PE or aortic dissection  He reports history of "mild MI" many years ago but denies cardiac catheterization or previous stenting   He denies any palpitations, shortness of breath, orthopnea, PND, diaphoresis, nausea, syncope or near syncope, pain or swelling in the extremities  Inpatient consult to Cardiology  Consult performed by: Guillermo Gamez PA-C  Consult ordered by: TASNEEM Jimenez          EKG:  Sinus bradycardia, septal infarct, age undetermined, nonspecific T-wave abnormality  Review of Systems   Constitutional: Negative for appetite change, chills, diaphoresis, fatigue and fever  Respiratory: Negative for cough, chest tightness and shortness of breath  Cardiovascular: Positive for chest pain  Negative for palpitations and leg swelling  Gastrointestinal: Negative for diarrhea, nausea and vomiting  Endocrine: Negative for cold intolerance and heat intolerance  Genitourinary: Negative for difficulty urinating, dysuria and enuresis  Musculoskeletal: Positive for arthralgias and back pain  Negative for gait problem  Allergic/Immunologic: Negative for environmental allergies and food allergies  Neurological: Negative for dizziness, facial asymmetry and headaches  Hematological: Negative for adenopathy  Does not bruise/bleed easily  Psychiatric/Behavioral: Negative for agitation, behavioral problems and confusion  Historical Information   No past medical history on file  No past surgical history on file  Social History     Substance and Sexual Activity   Alcohol Use Never    Frequency: Never    Binge frequency: Never     Social History     Substance and Sexual Activity   Drug Use Not on file     Social History     Tobacco Use   Smoking Status Not on file       Family History: No family history on file      Meds/Allergies   current meds:   Current Facility-Administered Medications   Medication Dose Route Frequency    acetaminophen (TYLENOL) tablet 650 mg  650 mg Oral Q6H PRN    amLODIPine (NORVASC) tablet 5 mg  5 mg Oral BID    aspirin chewable tablet 81 mg  81 mg Oral Daily    atorvastatin (LIPITOR) tablet 40 mg  40 mg Oral QPM    calcium carbonate (TUMS) chewable tablet 1,000 mg  1,000 mg Oral Daily PRN    docusate sodium (COLACE) capsule 100 mg  100 mg Oral BID    enoxaparin (LOVENOX) subcutaneous injection 40 mg  40 mg Subcutaneous Daily    hydrochlorothiazide (HYDRODIURIL) tablet 12 5 mg  12 5 mg Oral Daily    metoprolol tartrate (LOPRESSOR) tablet 25 mg  25 mg Oral Q12H [address]    naproxen (NAPROSYN) tablet 500 mg  500 mg Oral BID PRN    nitroglycerin (NITROSTAT) SL tablet 0 4 mg  0 4 mg Sublingual Q5 Min PRN    ondansetron (ZOFRAN) injection 4 mg  4 mg Intravenous Q6H PRN    sodium chloride (PF) 0 9 % injection 3 mL  3 mL Intravenous PRN    traMADol (ULTRAM) tablet 50 mg  50 mg Oral Q6H PRN     Allergies   Allergen Reactions    Penicillins Anxiety       Objective   Vitals: Blood pressure 147/89, pulse 60, temperature 98 1 °F (36 7 °C), resp  rate 18, height 5' 7" (1 702 m), weight 104 kg (229 lb 15 oz), SpO2 98 %  , Body mass index is 36 01 kg/m² ,   Orthostatic Blood Pressures      Most Recent Value   Blood Pressure  147/89 filed at 09/26/2019 0725   Patient Position - Orthostatic VS  Lying filed at 09/25/2019 1164          Systolic (38PHZ), CMI:998 , Min:125 , HEW:873     Diastolic (55ETF), RVE:30, Min:58, Max:117        Intake/Output Summary (Last 24 hours) at 9/26/2019 0902  Last data filed at 9/25/2019 2031  Gross per 24 hour   Intake 240 ml   Output    Net 240 ml       Invasive Devices     Peripheral Intravenous Line            Peripheral IV 09/25/19 Right Antecubital less than 1 day                    Physical Exam:  GEN: Alert and oriented x 3, in no acute distress  Well appearing and well nourished  HEENT: Sclera anicteric, conjunctivae pink, mucous membranes moist  Oropharynx clear  NECK: Supple, no carotid bruits, no significant JVD  Trachea midline, no thyromegaly  HEART: Regular rhythm, normal S1 and S2, no murmurs, clicks, gallops or rubs  PMI nondisplaced, no thrills  LUNGS: Clear to auscultation bilaterally; no wheezes, rales, or rhonchi   No increased work of breathing or signs of respiratory distress  ABDOMEN: Soft, nontender, nondistended, normoactive bowel sounds  EXTREMITIES: Skin warm and well perfused, no clubbing, cyanosis, or edema  NEURO: No focal findings  Normal speech  Mood and affect normal    SKIN: Normal without suspicious lesions on exposed skin        Lab Results:     Troponins:   Results from last 7 days   Lab Units 09/25/19  2336 09/25/19  2114 09/25/19  1437   TROPONIN I ng/mL <0 02 <0 02 <0 02       CBC with diff:   Results from last 7 days   Lab Units 09/26/19  0439 09/25/19  1437   WBC Thousand/uL 6 72 6 61   HEMOGLOBIN g/dL 15 2 16 4   HEMATOCRIT % 44 4 46 8   MCV fL 89 88   PLATELETS Thousands/uL 152 164   MCH pg 30 6 30 8   MCHC g/dL 34 2 35 0   RDW % 12 6 12 4   MPV fL 9 5 9 6   NRBC AUTO /100 WBCs  --  0         CMP:   Results from last 7 days   Lab Units 09/26/19  0439 09/25/19  1437   POTASSIUM mmol/L 3 8 3 4*   CHLORIDE mmol/L 104 104   CO2 mmol/L 25 25   BUN mg/dL 20 23   CREATININE mg/dL 0 95 1 09   CALCIUM mg/dL 8 5 9 0   AST U/L  --  18   ALT U/L  --  22   ALK PHOS U/L  --  56   EGFR ml/min/1 73sq m 84 71

## 2019-09-26 NOTE — PHYSICIAN ADVISOR
Current patient class: Observation  The patient is currently on Hospital Day: 2 at [address]      This patient was originally admitted to the hospital under observation status  After admission the patient was reevaluated and determined to require further hospitalization  The patient is now documented to require at least a 2nd midnight in the hospital  As such the patient is now expected to satisfy the 2 midnight benchmark and is therefore eligible for inpatient admission  After review of the relevant documentation, labs, vital signs and test results, the patient is appropriate for INPATIENT ADMISSION  Admission to the hospital as an inpatient is a complex decision making process which requires the practitioner to consider the patients presenting complaint, history and physical examination and all relevant testing  With this in mind, in this case, the patient was deemed appropriate for INPATIENT ADMISSION  After review of the documentation and testing available at the time of the admission I concur with this clinical determination of medical necessity  Rationale is as follows: The patient is a 71-year-old male who presented to the hospital on September 25, 2019 because of chest pain, arm pain, back pain  Associated with nausea and diaphoresis as well as dyspnea  Cardiology notes low suspicion for acute coronary syndrome  However the EKG shows new nonspecific T-wave abnormalities and although the echocardiogram did not show regional wall motion abnormalities he did have moderate to markedly dilated right atrium and right ventricle and markedly dilated left atrium  The patient is expected to remain in the hospital for pharmacologic stress test to exclude ischemia  The patient reports "triple valve repair" in 2009 as well as history of myocardial infarction    He will now cross a second midnight in the hospital for additional diagnostic testing based on his presentation and the abnormalities noted  The patients vitals on arrival were ED Triage Vitals [09/25/19 1357]   Temperature Pulse Respirations Blood Pressure SpO2   97 8 °F (36 6 °C) 74 17 (!) 193/117 98 %      Temp Source Heart Rate Source Patient Position - Orthostatic VS BP Location FiO2 (%)   Oral Monitor Sitting Left arm --      Pain Score       7           No past medical history on file  No past surgical history on file  The patient was admitted to the hospital at N/A on N/A for the following diagnosis:  Chest pain [R07 9]  HTN (hypertension) [I10]  Atypical chest pain [R07 89]    Consults have been placed to:   [address]:    09/25/19 2312 09/26/19 0304 09/26/19 0725 09/26/19 1138   BP: 134/79 125/76 147/89 147/90   BP Location:       Pulse: 56 57 60 63   Resp: 18 18 18 18   Temp: 98 3 °F (36 8 °C) 98 1 °F (36 7 °C) 98 1 °F (36 7 °C) 98 2 °F (36 8 °C)   TempSrc:       SpO2: 97% 98% 98% 97%   Weight:       Height:           Most recent labs:    Recent Labs     09/25/19  1437  09/25/19  2336 09/26/19  0439   WBC 6 61  --   --  6 72   HGB 16 4  --   --  15 2   HCT 46 8  --   --  44 4     --   --  152   K 3 4*  --   --  3 8   CALCIUM 9 0  --   --  8 5   BUN 23  --   --  20   CREATININE 1 09  --   --  0 95   TROPONINI <0 02   < > <0 02  --    AST 18  --   --   --    ALT 22  --   --   --    ALKPHOS 56  --   --   --     < > = values in this interval not displayed         Scheduled Meds:  Current Facility-Administered Medications:  acetaminophen 650 mg Oral Q6H PRN Murrel Alter, CRNP   amLODIPine 5 mg Oral BID Tanoel Alter, CRNP   aspirin 81 mg Oral Daily Murrel Alter, CRNP   atorvastatin 40 mg Oral QPM Zak Alter, CRNP   calcium carbonate 1,000 mg Oral Daily PRN Murrel Alter, CRNP   docusate sodium 100 mg Oral BID Tanoel Alter, CRNP   enoxaparin 40 mg Subcutaneous Daily Murrel Alter, CRNP   hydrochlorothiazide 12 5 mg Oral Daily TASNEEM Weiss metoprolol tartrate 25 mg Oral Q12H [address] Danielle Lucinda Crigler, TASNEEM   naproxen 500 mg Oral BID PRN Farrell Majors, CRNP   nitroglycerin 0 4 mg Sublingual Q5 Min PRN Farrell Majors, CRNP   ondansetron 4 mg Intravenous Q6H PRN Farrell Majors, CRNP   sodium chloride (PF) 3 mL Intravenous PRN Timothy Magallanes, TASNEEM   traMADol 50 mg Oral Q6H PRN Farrell Angels, LINETTENP     Continuous Infusions:   PRN Meds:   acetaminophen    calcium carbonate    naproxen    nitroglycerin    ondansetron    Insert peripheral IV **AND** sodium chloride (PF)    traMADol    Surgical procedures (if appropriate):

## 2019-09-26 NOTE — ASSESSMENT & PLAN NOTE
Presented with atypical chest pain  Troponin negative x3  EKG shows nonspecific T-wave changes  Echo shows EF of 60% with no regional wall motion abnormalities    Cardiology recommendation noted for inpatient nuclear stress test

## 2019-09-26 NOTE — UTILIZATION REVIEW
Initial Clinical Review    Admission: Date/Time/Statement: Observation 9/25/19 @1756  Orders Placed This Encounter   Procedures    Place in Observation (expected length of stay for this patient is less than two midnights)     Standing Status:   Standing     Number of Occurrences:   1     Order Specific Question:   Admitting Physician     Answer:   Batsheva Rodríguez     Order Specific Question:   Level of Care     Answer:   Med Surg [16]     ED Arrival Information     Expected Arrival Acuity Means of Arrival Escorted By Service Admission Type    - 9/25/2019 13:53 Emergent Walk-In Self General Medicine Emergency    Arrival Complaint    CHEST PAIN        Chief Complaint   Patient presents with    Back Pain     pt c/o upper back pain that radiates to left arm and to chest "for the past couple weeks" pt has hx of MI, open heart surgery, HTN    Chest Pain     pt denies chest pain at this time  pt states "it is intermittent radiating to my chest from my back"     Assessment/Plan:  71year old male to the ED from home with complaints of upper back pain radiating to left arm and chest for about a week  He ran out of his Coreg and hasn't been taking  Admitted under observation for chest pain  LUngs are clear, he is diaphoretic  * Chest pain  Assessment & Plan  Left-sided back, arm pain and chest pressure over the last 3-4 days  Cardiology consult  Telemetry  Initial troponin negative continue to trend  EKGs with troponins  Cardiac diet  Metoprolol aspirin statin  Lipid panel hemoglobin A1c  P r n  Nitro     Hypokalemia  Assessment & Plan  3 4 on admission, mild  Oral repletion  BMP in a m  Anticipated Length of Stay:  Patient will be admitted on an Observation basis with an anticipated length of stay of  < 2 midnights     Justification for Hospital Stay:  Evaluation of chest pain, echocardiogram, trend troponins    ED Triage Vitals [09/25/19 1357]   Temperature Pulse Respirations Blood Pressure SpO2   97 8 °F (36 6 °C) 74 17 (!) 193/117 98 %      Temp Source Heart Rate Source Patient Position - Orthostatic VS BP Location FiO2 (%)   Oral Monitor Sitting Left arm --      Pain Score       7        Wt Readings from Last 1 Encounters:   09/25/19 104 kg (229 lb 15 oz)     Additional Vital Signs:   Date/Time Temp Pulse Resp BP MAP (mmHg) SpO2 O2 Device   09/26/19 07:25:39 98 1 °F (36 7 °C) 60 18 147/89 108 98 %    09/26/19 03:04:05 98 1 °F (36 7 °C) 57 18 125/76 92 98 %    09/25/19 23:12:42 98 3 °F (36 8 °C) 56 18 134/79 97 97 %    09/25/19 19:26:27 98 6 °F (37 °C) 58 19 153/95 114 98 %    09/25/19 1745  60 15 130/58  96 % None (Room air)   09/25/19 1600  61 12 141/95  96 % None (Room air)   09/25/19 1530  62 14 150/93  96 % None (Room air)   09/25/19 1418       None (Room air)   09/25/19 1357 97 8 °F (36 6 °C) 74 17 193/117Abnormal         Pertinent Labs/Diagnostic Test Results:   CTA dissection protocol: No evidence for aortic dissection or aneurysm  2   Hepatomegaly   Cholelithiasis  3   Mild hydronephrosis on the left   Slight fullness of the collecting system on the right   This is probably related to the distended urinary bladder  4    2 9 x 1 0 cm ovoid soft tissue density just posterior to the umbilical region on the right, image 180 series 3   Question related to prior surgery   If there is no history of surgery, consider follow-up CT examination in 6-12 months to ensure   Stability  CXR:  No acute cardiopulmonary disease  EKG:   Interpretation:     Interpretation: normal    Rate:     ECG rate:  69    ECG rate assessment: normal    Rhythm:     Rhythm: sinus rhythm    Ectopy:     Ectopy: none    QRS:     QRS axis:  Normal  Conduction:     Conduction: normal    ST segments:     ST segments:  Normal  T waves:     T waves: normal    Results from last 7 days   Lab Units 09/26/19  0439 09/25/19  1437   WBC Thousand/uL 6 72 6 61   HEMOGLOBIN g/dL 15 2 16 4   HEMATOCRIT % 44 4 46 8   PLATELETS Thousands/uL 152 164   NEUTROS ABS Thousands/µL  --  4 07         Results from last 7 days   Lab Units 09/26/19  0439 09/25/19  1437   SODIUM mmol/L 139 140   POTASSIUM mmol/L 3 8 3 4*   CHLORIDE mmol/L 104 104   CO2 mmol/L 25 25   ANION GAP mmol/L 10 11   BUN mg/dL 20 23   CREATININE mg/dL 0 95 1 09   EGFR ml/min/1 73sq m 84 71   CALCIUM mg/dL 8 5 9 0   MAGNESIUM mg/dL 1 9  --    PHOSPHORUS mg/dL 3 8  --      Results from last 7 days   Lab Units 09/25/19  1437   AST U/L 18   ALT U/L 22   ALK PHOS U/L 56   TOTAL PROTEIN g/dL 8 1   ALBUMIN g/dL 4 1   TOTAL BILIRUBIN mg/dL 0 70         Results from last 7 days   Lab Units 09/26/19  0439 09/25/19  1437   GLUCOSE RANDOM mg/dL 106 111         Results from last 7 days   Lab Units 09/25/19  1437   HEMOGLOBIN A1C % 5 7   EAG mg/dl 117     Results from last 7 days   Lab Units 09/25/19  2336 09/25/19  2114 09/25/19  1437   TROPONIN I ng/mL <0 02 <0 02 <0 02       ED Treatment:   Medication Administration from 09/25/2019 1353 to 09/25/2019 1900       Date/Time Order Dose Route Action     09/25/2019 1516 carvedilol (COREG) tablet 6 25 mg 6 25 mg Oral Given        Admitting Diagnosis: Chest pain [R07 9]  HTN (hypertension) [I10]  Atypical chest pain [R07 89]  Age/Sex: 71 y o  male  Admission Orders:  Tele  Up and oOB  ECHO  Current Facility-Administered Medications:  acetaminophen 650 mg Oral Q6H PRN   amLODIPine 5 mg Oral BID   aspirin 81 mg Oral Daily   atorvastatin 40 mg Oral QPM   calcium carbonate 1,000 mg Oral Daily PRN   docusate sodium 100 mg Oral BID   enoxaparin 40 mg Subcutaneous Daily   hydrochlorothiazide 12 5 mg Oral Daily   metoprolol tartrate 25 mg Oral Q12H [address]   naproxen 500 mg Oral BID PRN   nitroglycerin 0 4 mg Sublingual Q5 Min PRN   ondansetron 4 mg Intravenous Q6H PRN   sodium chloride (PF) 3 mL Intravenous PRN   traMADol 50 mg Oral Q6H PRN       IP CONSULT TO CARDIOLOGY    Network Utilization Review Department  Phone: [phone];  Fax [phone]  [email or web address]  ATTENTION: Please call with any questions or concerns to [phone]  and carefully listen to the prompts so that you are directed to the right person  Send all requests for admission clinical reviews, approved or denied determinations and any other requests to fax [phone]   All voicemails are confidential

## 2019-09-26 NOTE — PROGRESS NOTES
Progress Note - Austin Rogers 1955, 71 y o  male MRN: [id number]    Unit/Bed#: -01 Encounter: [id number]    Primary Care Provider: No primary care provider on file  Date and time admitted to hospital: 2019  2:04 PM        * Chest pain  Assessment & Plan  Presented with atypical chest pain  Troponin negative x3  EKG shows nonspecific T-wave changes  Echo shows EF of 60% with no regional wall motion abnormalities  Cardiology recommendation noted for inpatient nuclear stress test     HTN (hypertension)  Assessment & Plan  Controlled  Continue current regimen  Hypokalemia  Assessment & Plan  Resolved        VTE Pharmacologic Prophylaxis:   Pharmacologic: Enoxaparin (Lovenox)    Patient Centered Rounds: I have performed bedside rounds with nursing staff today  Discussions with Specialists or Other Care Team Provider:  Cardiology    Education and Discussions with Family / Patient:  Patient    Time Spent for Care: 30 minutes  More than 50% of total time spent on counseling and coordination of care as described above  Current Length of Stay: 0 day(s)    Current Patient Status: Inpatient   Certification Statement: The patient will continue to require additional inpatient hospital stay due to Plan for nuclear stress test tomorrow    Discharge Plan:  Likely tomorrow after nuclear stress test result  Code Status: Level 1 - Full Code      Subjective:   Afebrile, hemodynamically stable  Still complaining of left-sided arm/left back discomfort  Denies nausea, diaphoreses, dyspnea, orthopnea, fever, chills, any other new complaints  Plan for nuclear stress test tomorrow per Cardiology  Objective:     Vitals:   Temp (24hrs), Av 4 °F (36 9 °C), Min:98 1 °F (36 7 °C), Max:98 8 °F (37 1 °C)    Temp:  [98 1 °F (36 7 °C)-98 8 °F (37 1 °C)] 98 8 °F (37 1 °C)  HR:  [56-63] 62  Resp:  [18-21] 21  BP: (125-153)/(76-95) 132/85  SpO2:  [97 %-98 %] 97 %  Body mass index is 36 01 kg/m²  Input and Output Summary (last 24 hours): Intake/Output Summary (Last 24 hours) at 9/26/2019 1746  Last data filed at 9/26/2019 1000  Gross per 24 hour   Intake 240 ml   Output 750 ml   Net -510 ml       Physical Exam:     Physical Exam   Constitutional: He is oriented to person, place, and time  No distress  HENT:   Head: Normocephalic and atraumatic  Eyes: Pupils are equal, round, and reactive to light  EOM are normal    Neck: Normal range of motion  Neck supple  Cardiovascular: Normal rate and regular rhythm  Pulmonary/Chest: Effort normal and breath sounds normal  No stridor  No respiratory distress  Abdominal: Soft  Bowel sounds are normal  He exhibits no distension  There is no tenderness  Musculoskeletal: Normal range of motion  Neurological: He is alert and oriented to person, place, and time  Skin: He is not diaphoretic  Psychiatric: His behavior is normal        Additional Data:     Labs:    Results from last 7 days   Lab Units 09/26/19  0439 09/25/19  1437   WBC Thousand/uL 6 72 6 61   HEMOGLOBIN g/dL 15 2 16 4   HEMATOCRIT % 44 4 46 8   PLATELETS Thousands/uL 152 164   NEUTROS PCT %  --  60   LYMPHS PCT %  --  24   MONOS PCT %  --  13*   EOS PCT %  --  1     Results from last 7 days   Lab Units 09/26/19  0439 09/25/19  1437   SODIUM mmol/L 139 140   POTASSIUM mmol/L 3 8 3 4*   CHLORIDE mmol/L 104 104   CO2 mmol/L 25 25   BUN mg/dL 20 23   CREATININE mg/dL 0 95 1 09   ANION GAP mmol/L 10 11   CALCIUM mg/dL 8 5 9 0   ALBUMIN g/dL  --  4 1   TOTAL BILIRUBIN mg/dL  --  0 70   ALK PHOS U/L  --  56   ALT U/L  --  22   AST U/L  --  18   GLUCOSE RANDOM mg/dL 106 111             Results from last 7 days   Lab Units 09/25/19  1437   HEMOGLOBIN A1C % 5 7               * I Have Reviewed All Lab Data Listed Above  * Additional Pertinent Lab Tests Reviewed:  All Labs Within Last 24 Hours Reviewed      Recent Cultures (last 7 days):           Last 24 Hours Medication List:     Current Facility-Administered Medications:  acetaminophen 650 mg Oral Q6H PRN Marcene Carrel, CRNP   amLODIPine 5 mg Oral BID Marcene Carrel, CRNP   aspirin 81 mg Oral Daily Marcene Carrel, TASNEEM   atorvastatin 40 mg Oral QPM Marcene Carrel, CRNP   calcium carbonate 1,000 mg Oral Daily PRN Marcene Carrel, TASNEEM   docusate sodium 100 mg Oral BID Marcene Carrel, CRNP   enoxaparin 40 mg Subcutaneous Daily Marcene Carrel, CRNP   hydrochlorothiazide 12 5 mg Oral Daily Marcene Carrel, CRNP   metoprolol tartrate 25 mg Oral Q12H [address] Marcene Carrel, CRNP   naproxen 500 mg Oral BID PRN Marcene Carrel, CRNP   nitroglycerin 0 4 mg Sublingual Q5 Min PRN Marcene Carrel, CRNP   ondansetron 4 mg Intravenous Q6H PRN Marcene Carrel, CRNP   sodium chloride (PF) 3 mL Intravenous PRN TASNEEM Eden   traMADol 50 mg Oral Q6H PRN Marcene Carrel, CRNP        Today, Patient Was Seen By: Trang Sheriff MD    ** Please Note: Dictation voice to text software may have been used in the creation of this document   **

## 2019-09-27 ENCOUNTER — APPOINTMENT (INPATIENT)
Dept: NON INVASIVE DIAGNOSTICS | Facility: HOSPITAL | Age: 64
DRG: 206 | End: 2019-09-27
Payer: MEDICARE

## 2019-09-27 ENCOUNTER — APPOINTMENT (INPATIENT)
Dept: RADIOLOGY | Facility: HOSPITAL | Age: 64
DRG: 206 | End: 2019-09-27
Payer: MEDICARE

## 2019-09-27 ENCOUNTER — APPOINTMENT (INPATIENT)
Dept: NUCLEAR MEDICINE | Facility: HOSPITAL | Age: 64
DRG: 206 | End: 2019-09-27
Payer: MEDICARE

## 2019-09-27 VITALS
OXYGEN SATURATION: 98 % | HEIGHT: 67 IN | WEIGHT: 229.94 LBS | BODY MASS INDEX: 36.09 KG/M2 | RESPIRATION RATE: 18 BRPM | SYSTOLIC BLOOD PRESSURE: 129 MMHG | HEART RATE: 64 BPM | TEMPERATURE: 98.1 F | DIASTOLIC BLOOD PRESSURE: 78 MMHG

## 2019-09-27 LAB
CHEST PAIN STATEMENT: NORMAL
MAX DIASTOLIC BP: 85 MMHG
MAX HEART RATE: 85 BPM
MAX PREDICTED HEART RATE: 155 BPM
MAX. SYSTOLIC BP: 149 MMHG
PROTOCOL NAME: NORMAL
REASON FOR TERMINATION: NORMAL
TARGET HR FORMULA: NORMAL
TEST INDICATION: NORMAL
TIME IN EXERCISE PHASE: NORMAL

## 2019-09-27 PROCEDURE — 78452 HT MUSCLE IMAGE SPECT MULT: CPT

## 2019-09-27 PROCEDURE — A9502 TC99M TETROFOSMIN: HCPCS

## 2019-09-27 PROCEDURE — 93018 CV STRESS TEST I&R ONLY: CPT | Performed by: INTERNAL MEDICINE

## 2019-09-27 PROCEDURE — 99239 HOSP IP/OBS DSCHRG MGMT >30: CPT | Performed by: STUDENT IN AN ORGANIZED HEALTH CARE EDUCATION/TRAINING PROGRAM

## 2019-09-27 PROCEDURE — 73030 X-RAY EXAM OF SHOULDER: CPT

## 2019-09-27 PROCEDURE — 99232 SBSQ HOSP IP/OBS MODERATE 35: CPT | Performed by: INTERNAL MEDICINE

## 2019-09-27 PROCEDURE — 78452 HT MUSCLE IMAGE SPECT MULT: CPT | Performed by: INTERNAL MEDICINE

## 2019-09-27 PROCEDURE — 93016 CV STRESS TEST SUPVJ ONLY: CPT | Performed by: INTERNAL MEDICINE

## 2019-09-27 PROCEDURE — 93017 CV STRESS TEST TRACING ONLY: CPT

## 2019-09-27 RX ORDER — ACETAMINOPHEN 325 MG/1
650 TABLET ORAL EVERY 6 HOURS PRN
Qty: 30 TABLET | Refills: 0 | Status: SHIPPED | OUTPATIENT
Start: 2019-09-27

## 2019-09-27 RX ORDER — HYDROCHLOROTHIAZIDE 12.5 MG/1
12.5 CAPSULE, GELATIN COATED ORAL DAILY
Qty: 30 CAPSULE | Refills: 0 | Status: SHIPPED | OUTPATIENT
Start: 2019-09-27 | End: 2019-10-18 | Stop reason: SDUPTHER

## 2019-09-27 RX ORDER — ATORVASTATIN CALCIUM 40 MG/1
40 TABLET, FILM COATED ORAL EVERY EVENING
Qty: 30 TABLET | Refills: 0 | Status: SHIPPED | OUTPATIENT
Start: 2019-09-27 | End: 2019-11-20 | Stop reason: SDUPTHER

## 2019-09-27 RX ORDER — CARVEDILOL 6.25 MG/1
6.25 TABLET ORAL 2 TIMES DAILY WITH MEALS
Qty: 30 TABLET | Refills: 0 | Status: SHIPPED | OUTPATIENT
Start: 2019-09-27 | End: 2019-10-31 | Stop reason: SDUPTHER

## 2019-09-27 RX ORDER — AMLODIPINE BESYLATE 5 MG/1
5 TABLET ORAL 2 TIMES DAILY
Qty: 60 TABLET | Refills: 0 | Status: SHIPPED | OUTPATIENT
Start: 2019-09-27 | End: 2019-11-20 | Stop reason: ALTCHOICE

## 2019-09-27 RX ORDER — DOCUSATE SODIUM 100 MG/1
100 CAPSULE, LIQUID FILLED ORAL 2 TIMES DAILY
Qty: 10 CAPSULE | Refills: 0 | Status: SHIPPED | OUTPATIENT
Start: 2019-09-27 | End: 2019-11-20 | Stop reason: ALTCHOICE

## 2019-09-27 RX ORDER — NAPROXEN 500 MG/1
500 TABLET ORAL 2 TIMES DAILY PRN
Qty: 20 TABLET | Refills: 0 | Status: SHIPPED | OUTPATIENT
Start: 2019-09-27

## 2019-09-27 RX ADMIN — ENOXAPARIN SODIUM 40 MG: 40 INJECTION SUBCUTANEOUS at 10:46

## 2019-09-27 RX ADMIN — NAPROXEN 500 MG: 250 TABLET ORAL at 13:00

## 2019-09-27 RX ADMIN — REGADENOSON 0.4 MG: 0.08 INJECTION, SOLUTION INTRAVENOUS at 09:32

## 2019-09-27 RX ADMIN — AMLODIPINE BESYLATE 5 MG: 5 TABLET ORAL at 10:45

## 2019-09-27 RX ADMIN — ASPIRIN 81 MG 81 MG: 81 TABLET ORAL at 10:46

## 2019-09-27 RX ADMIN — HYDROCHLOROTHIAZIDE 12.5 MG: 12.5 TABLET ORAL at 10:45

## 2019-09-27 RX ADMIN — METOPROLOL TARTRATE 25 MG: 25 TABLET, FILM COATED ORAL at 10:46

## 2019-09-27 NOTE — UTILIZATION REVIEW
OBS 9/25 @ 6948 JYVJLYSE TO INPATIENT  9/26 @ [address]    09/26/19 1553  Inpatient Admission Once     Transfer Service: General Medicine       Question Answer Comment   Admitting Physician Ignacio Rivas V    Level of Care Med Surg    Estimated length of stay More than 2 Midnights    Certification I certify that inpatient services are medically necessary for this patient for a duration of greater than two midnights  See H&P and MD Progress Notes for additional information about the patient's course of treatment          09/26/19 8551

## 2019-09-27 NOTE — DISCHARGE INSTR - AVS FIRST PAGE
Recommended close follow-up with primary care provider in 3-5 days of discharge  Recommended close follow-up with primary Cardiology outpatient

## 2019-09-27 NOTE — ASSESSMENT & PLAN NOTE
Presented with atypical chest pain  Troponin negative x3  EKG shows nonspecific T-wave changes  Echo shows EF of 60% with no regional wall motion abnormalities  Normal nuclear stress test   Chest/back pain likely musculoskeletal, costochondritis  Did improved with naproxen  Continue naproxen as needed with caution  Cardio recommendation noted to continue current aspirin, beta-blocker, statin, hydrochlorothiazide, on Norvasc

## 2019-09-27 NOTE — DISCHARGE SUMMARY
Discharge- Jaziel Almendarez 1955, 71 y o  male MRN: [id number]    Unit/Bed#: -01 Encounter: [id number]    Primary Care Provider: No primary care provider on file  Date and time admitted to hospital: 9/25/2019  2:04 PM        * Chest pain  Assessment & Plan  Presented with atypical chest pain  Troponin negative x3  EKG shows nonspecific T-wave changes  Echo shows EF of 60% with no regional wall motion abnormalities  Normal nuclear stress test   Chest/back pain likely musculoskeletal, costochondritis  Did improved with naproxen  Continue naproxen as needed with caution  Cardio recommendation noted to continue current aspirin, beta-blocker, statin, hydrochlorothiazide, on Norvasc  HTN (hypertension)  Assessment & Plan  Controlled  Continue current regimen  Hypokalemia  Assessment & Plan  Resolved          Discharging Physician / Practitioner: Corinne Zhao MD  PCP: No primary care provider on file  Admission Date:   Admission Orders (From admission, onward)     Ordered        09/26/19 1554  Inpatient Admission  Once         09/25/19 1756  Place in Observation (expected length of stay for this patient is less than two midnights)  Once                   Discharge Date: 09/27/19    Resolved Problems  Date Reviewed: 9/27/2019    None          Consultations During Hospital Stay:  · Cardiology    Procedures Performed:   · Normal nuclear stress test    Reason for Admission:  Chest pain/left back, left arm pain  Musculoskeletal/costochondritis in nature  Hospital Course:     Jaziel Almendarez is a 71 y o  male patient reported past medical history of open heart surgery in 2009 for triple valve repair but unable to give me any detailed information, hypertension, history of trauma to right lower extremity post surgery repair, ambulates using a cane, presents  on 09/25/2019 with complaining of left back, left arm, left chest pain or lasted 4 days     patient is very weak history and unable to tease out detail of his complaints  Back pain is reproducible on exam   Patient had been troponin negative x3  EKG revealed sinus bradycardia with septal infarct, nonspecific T-wave abnormality  Echo shows EF 60% without regional wall motion abnormality  Patient had normal nuclear stress test 09/27/2019  Cardio recommendation noted to continue with NSAIDs for musculoskeletal pain  Recommended to continue Coreg 6 25 mg b i d , hydrochlorothiazide 12 5 mg daily, Norvasc 5 mg b i d  (reports that he has been taking Norvasc 5 mg b i d )  Patient reports that after getting NSAIDs his back pain improved  Patient reports that he did ran out of his prescription and was not able to get hold of his cardiology in [place] and requesting for prescription refills as well as requesting to follow up with [address] [place] [address] Cardiology  Currently denies chest pain, dyspnea, nausea, diaphoreses, any other new complaints  Yancy Sewell to go home  Hemodynamically stable to discharge  Recommended close follow-up with primary care provider, Cardiology outpatient  Patient agreeable with follow-up plan as stated above  No other events reported  Please see above list of diagnoses and related plan for additional information  Condition at Discharge: fair     Discharge Day Visit / Exam:     Subjective:  Afebrile, hemodynamically stable  Reports pain improved after NSAIDs  Currently denies chest pain, dyspnea, fever, chills, nausea, diaphoreses, any other new complaints  Yacny Sewell to go home  Vitals: Blood Pressure: 129/78 (09/27/19 1454)  Pulse: 64 (09/27/19 1136)  Temperature: 98 1 °F (36 7 °C) (09/27/19 1454)  Temp Source: Oral (09/25/19 1926)  Respirations: 18 (09/27/19 1454)  Height: 5' 7" (170 2 cm) (09/25/19 1357)  Weight - Scale: 104 kg (229 lb 15 oz) (09/25/19 1357)  SpO2: 98 % (09/27/19 1136)  Exam:   Physical Exam   Constitutional: He is oriented to person, place, and time  No distress     HENT:   Head: Normocephalic and atraumatic  Eyes: Pupils are equal, round, and reactive to light  EOM are normal    Neck: Normal range of motion  Neck supple  Cardiovascular: Normal rate and regular rhythm  Pulmonary/Chest: Effort normal and breath sounds normal  No stridor  No respiratory distress  Abdominal: Soft  Bowel sounds are normal  He exhibits no distension  There is no tenderness  Musculoskeletal: Normal range of motion  Left upper Back/scapula edge area tender on palpation  Neurological: He is alert and oriented to person, place, and time  Skin: He is not diaphoretic  Psychiatric: His behavior is normal        Discharge instructions/Information to patient and family:   See after visit summary for information provided to patient and family  Provisions for Follow-Up Care:  See after visit summary for information related to follow-up care and any pertinent home health orders  Disposition:     Home    For Discharges to [facility] SN[facility]:   · Not Applicable to this Patient - Not Applicable to this Patient    Planned Readmission:  None planned     Discharge Statement:  I spent 35 minutes discharging the patient  This time was spent on the day of discharge  I had direct contact with the patient on the day of discharge  Greater than 50% of the total time was spent examining patient, answering all patient questions, arranging and discussing plan of care with patient as well as directly providing post-discharge instructions  Additional time then spent on discharge activities  Discharge Medications:  See after visit summary for reconciled discharge medications provided to patient and family        ** Please Note: This note has been constructed using a voice recognition system **

## 2019-09-27 NOTE — DISCHARGE INSTRUCTIONS
Costochondritis   WHAT YOU NEED TO KNOW:   Costochondritis is a condition that causes pain in the cartilage that connect your ribs to your sternum (breastbone)  Cartilage is the tough, bendable tissue that protects your bones  DISCHARGE INSTRUCTIONS:   Medicines:  · Acetaminophen: This medicine decreases pain  Acetaminophen is available without a doctor's order  Ask how much to take and how often to take it  Follow directions  Acetaminophen can cause liver damage if not taken correctly  · NSAIDs , such as ibuprofen, help decrease swelling, pain, and fever  This medicine is available with or without a doctor's order  NSAIDs can cause stomach bleeding or kidney problems in certain people  If you take blood thinner medicine, always ask if NSAIDs are safe for you  Always read the medicine label and follow directions  Do not give these medicines to children under 10months of age without direction from your child's healthcare provider  · Take your medicine as directed  Contact your healthcare provider if you think your medicine is not helping or if you have side effects  Tell him or her if you are allergic to any medicine  Keep a list of the medicines, vitamins, and herbs you take  Include the amounts, and when and why you take them  Bring the list or the pill bottles to follow-up visits  Carry your medicine list with you in case of an emergency  Follow up with your healthcare provider as directed:  Write down your questions so you remember to ask them during your visits  Rest:  You may need to get more rest  Learn which movements and activities cause pain, and avoid doing them  Do not carry objects, such as a purse or backpack, if this is painful  Avoid activities such as rowing and weightlifting until your pain decreases or goes away  Ask which activities are best for you to do while you recover  Heat:  Heat helps decrease pain in some patients   Apply heat on the area for 20 to 30 minutes every 2 hours for as many days as directed  Ice:  Ice helps decrease swelling and pain  Ice may also help prevent tissue damage  Use an ice pack, or put crushed ice in a plastic bag  Cover it with a towel and place it on the painful area for 15 to 20 minutes every hour or as directed  Stretching exercises:  Gentle stretching may help your symptoms   a doorway and put your hands on the door frame at the level of your ears or shoulders  Take 1 step forward and gently stretch your chest  Try this with your hands higher up on the doorway  Contact your healthcare provider if:   · You have a fever  · The painful areas of your chest look swollen, red, and feel warm to the touch  · You cannot sleep because of the pain  · You have questions or concerns about your condition or care  © 2017 [employer]  Information is for End User's use only and may not be sold, redistributed or otherwise used for commercial purposes  All illustrations and images included in CareNotes® are the copyrighted property of A D A M [employer] Inc  or Charlie Negrete  The above information is an  only  It is not intended as medical advice for individual conditions or treatments  Talk to your doctor, nurse or pharmacist before following any medical regimen to see if it is safe and effective for you  Naproxen (By mouth)   Naproxen (na-PROX-en)  Treats fever and pain  Also treats arthritis, gout, and menstrual cramps or pain  This medicine is an NSAID  Brand Name(s): Aleve, Aleve Arthritis, All Day Pain Relief, All Day Relief, Anaprox, Anaprox DS, EC Naprosyn, Flanax Pain Relief Kit, [facility] All Day Pain Relief, Good Sense All Day Pain Relief, Good Sense Naproxen Sodium, [employer] All Day Pain Relief, Mediproxen, Naprelan, NaproPax   There may be other brand names for this medicine  When This Medicine Should Not Be Used: This medicine is not right for everyone   Do not use it if you had an allergic reaction (including asthma) to naproxen, aspirin, or other NSAIDs  Do not use it if you have had a heart surgery (such as coronary artery bypass graft)  How to Use This Medicine:   Liquid Filled Capsule, Liquid, Tablet, Coated Tablet, Long Acting Tablet  · Your doctor will tell you how much medicine to use  Do not use more than directed  · Take this medicine with food or milk so it does not upset your stomach  Drink a full glass of water after each dose  · Delayed-release tablet: Swallow whole  Do not crush, break, or chew it  · Oral liquid: Shake well just before you measure the dose  Measure the oral liquid medicine with a marked measuring spoon, oral syringe, or medicine cup  · Follow the instructions on the medicine label if you are using this medicine without a prescription  · This medicine should come with a Medication Guide  Ask your pharmacist for a copy if you do not have one  · Missed dose: Take a dose as soon as you remember  If it is almost time for your next dose, wait until then and take a regular dose  Do not take extra medicine to make up for a missed dose  · Store the medicine in a closed container at room temperature, away from heat, moisture, and direct light  Oral liquid: Do not freeze  Drugs and Foods to Avoid:   Ask your doctor or pharmacist before using any other medicine, including over-the-counter medicines, vitamins, and herbal products  · Do not use any other NSAID medicine unless your doctor says it is okay  Some other NSAIDs are aspirin, celecoxib, diclofenac, diflunisal, ibuprofen, or salsalate  · Some medicines can affect how naproxen works   Tell your doctor if you are using any of the following:  ¨ Cholestyramine, cyclosporine, digoxin, lithium, methotrexate, probenecid, sucralfate  ¨ Antacids  ¨ Blood pressure medicine  ¨ Blood thinner (including warfarin)  ¨ Diuretic (water pill)  ¨ Medicine to treat depression  ¨ Steroid medicine  · Do not drink alcohol while you are using this medicine  Warnings While Using This Medicine:   · Tell your doctor if you are pregnant or breastfeeding  Do not use this medicine during the later part of a pregnancy, unless your doctor tells you to  · Tell your doctor if you have kidney disease, liver disease, anemia, asthma, bleeding problems, high blood pressure, heart failure, a recent heart attack, or a history of stomach or bowel problems (including ulcers or bleeding)  Tell your doctor if you smoke or drink alcohol  · This medicine may cause the following problems:  ¨ Higher risk of blood clots, heart attack, stroke, or heart failure  ¨ Bleeding and ulcers in the stomach or intestines  ¨ Liver damage  ¨ Kidney damage  ¨ High potassium levels in the blood  ¨ Serious skin reactions  · Call your doctor if symptoms get worse, pain lasts more than 10 days, or fever lasts more than 3 days  · Tell any doctor or dentist who treats that you are using this medicine, especially if you have surgery or a procedure  · This medicine may make you dizzy or drowsy  Do not drive or do anything else that could be dangerous until you know how this medicine affects you  · Ovulation may be delayed in some women while this medicine is being used  Talk to your doctor if you have concerns about this  · Tell any doctor or dentist who treats you that you are using this medicine  This medicine may affect certain medical test results  · Your doctor will do lab tests at regular visits to check on the effects of this medicine  Keep all appointments  · Keep all medicine out of the reach of children  Never share your medicine with anyone    Possible Side Effects While Using This Medicine:   Call your doctor right away if you notice any of these side effects:  · Allergic reaction: Itching or hives, swelling in your face or hands, swelling or tingling in your mouth or throat, chest tightness, trouble breathing  · Blistering, peeling, red skin rash  · Bloody or black, tarry stools, severe stomach pain, vomiting blood or something that looks like coffee grounds  · Change in how much or how often you urinate  · Chest pain that may spread, trouble breathing, unusual sweating, fainting  · Dark urine or pale stools, nausea, vomiting, loss of appetite, stomach pain, yellow skin or eyes  · Numbness or weakness on one side of your body, sudden or severe headache, problems with vision, speech, or walking  · Rapid weight gain, swelling in your hands, ankles, or feet  · Unusual bleeding, bruising, or weakness  · Vision changes  If you notice these less serious side effects, talk with your doctor:   · Mild nausea, diarrhea, or constipation  · Ringing in your ears, dizziness, headache  If you notice other side effects that you think are caused by this medicine, tell your doctor  Call your doctor for medical advice about side effects  You may report side effects to FDA at 5-309-FDA-9284  © 2017 [employer] Information is for End User's use only and may not be sold, redistributed or otherwise used for commercial purposes  The above information is an  only  It is not intended as medical advice for individual conditions or treatments  Talk to your doctor, nurse or pharmacist before following any medical regimen to see if it is safe and effective for you

## 2019-09-27 NOTE — PROGRESS NOTES
Cardiology Progress Note - Coral Ornelas 71 y o  male MRN: [id number]    Unit/Bed#: -01 Encounter: [id number]      Assessment/Plan:  1- Atypical chest pain, likely MSK related  Pharmacologic stress test showed normal study with preserved LVEF  Pain management, NSAIDs as per [facility]  2- Hypertension, stable on Coreg 6 25 mg b i d , HCTZ 12 5 mg daily and Norvasc 5 mg daily  3- Hx of valve repair, TTE showed normal EF 60% without regional wall motion abnormalities, mild LVH, moderate to markedly dilated RA and RV, markedly dilated LA, trace TR, mild aortic root dilation  Patient to follow with primary cardiologist on discharge  Subjective:   Patient seen and examined  No significant events overnight  Objective:     Vitals: Blood pressure 132/94, pulse 64, temperature 98 1 °F (36 7 °C), resp  rate 17, height 5' 7" (1 702 m), weight 104 kg (229 lb 15 oz), SpO2 98 %  , Body mass index is 36 01 kg/m² ,   Orthostatic Blood Pressures      Most Recent Value   Blood Pressure  132/94 filed at 09/27/2019 1136   Patient Position - Orthostatic VS  Lying filed at 09/25/2019 1745            Intake/Output Summary (Last 24 hours) at 9/27/2019 1308  Last data filed at 9/27/2019 5710  Gross per 24 hour   Intake    Output 1075 ml   Net -1075 ml         Physical Exam:    GEN: Coral Ornelas appears well, alert and oriented x 3, pleasant and cooperative   HEENT: pupils equal, round, and reactive to light; extraocular muscles intact  NECK: supple, no carotid bruits   HEART: regular rhythm, normal S1 and S2, no murmurs, clicks, gallops or rubs   LUNGS: clear to auscultation bilaterally; no wheezes, rales, or rhonchi   ABDOMEN: normal bowel sounds, soft, no tenderness, no distention  EXTREMITIES: peripheral pulses normal; no clubbing, cyanosis, or edema      Medications:      Current Facility-Administered Medications:     acetaminophen (TYLENOL) tablet 650 mg, 650 mg, Oral, Q6H PRN, James Jeong, CRNP    amLODIPine (NORVASC) tablet 5 mg, 5 mg, Oral, BID, TASNEEM Antonio, 5 mg at 09/27/19 1045    aspirin chewable tablet 81 mg, 81 mg, Oral, Daily, Claudy Dia, CRNP, 81 mg at 09/27/19 1046    atorvastatin (LIPITOR) tablet 40 mg, 40 mg, Oral, QPM, TASNEEM Antonio, 40 mg at 09/26/19 1700    calcium carbonate (TUMS) chewable tablet 1,000 mg, 1,000 mg, Oral, Daily PRN, Green Jacobills, CRNP    docusate sodium (COLACE) capsule 100 mg, 100 mg, Oral, BID, TASNEEM Antonio, 100 mg at 09/26/19 1700    enoxaparin (LOVENOX) subcutaneous injection 40 mg, 40 mg, Subcutaneous, Daily, Green Ifeoma, CRNP, 40 mg at 09/27/19 1046    hydrochlorothiazide (HYDRODIURIL) tablet 12 5 mg, 12 5 mg, Oral, Daily, TASNEEM Antonio, 12 5 mg at 09/27/19 1045    naproxen (NAPROSYN) tablet 500 mg, 500 mg, Oral, BID PRN, Claudy Dia, CRNP, 500 mg at 09/27/19 1300    nitroglycerin (NITROSTAT) SL tablet 0 4 mg, 0 4 mg, Sublingual, Q5 Min PRN, TASNEEM Antonio    ondansetron [facility]) injection 4 mg, 4 mg, Intravenous, Q6H PRN, Green Grills, CRNP    Insert peripheral IV, , , Once **AND** sodium chloride (PF) 0 9 % injection 3 mL, 3 mL, Intravenous, PRN, [facility], CRNP    traMADol (ULTRAM) tablet 50 mg, 50 mg, Oral, Q6H PRN, Green Grills, CRNP, 50 mg at 09/25/19 2031     Labs & Results:    Results from last 7 days   Lab Units 09/25/19  2336 09/25/19  2114 09/25/19  1437   TROPONIN I ng/mL <0 02 <0 02 <0 02     Results from last 7 days   Lab Units 09/26/19  0439 09/25/19  1437   WBC Thousand/uL 6 72 6 61   HEMOGLOBIN g/dL 15 2 16 4   HEMATOCRIT % 44 4 46 8   PLATELETS Thousands/uL 152 164     Results from last 7 days   Lab Units 09/26/19  0439   TRIGLYCERIDES mg/dL 124   HDL mg/dL 26*     Results from last 7 days   Lab Units 09/26/19  0439 09/25/19  1437   POTASSIUM mmol/L 3 8 3 4*   CHLORIDE mmol/L 104 104   CO2 mmol/L 25 25   BUN mg/dL 20 23   CREATININE mg/dL 0 95 1 09   CALCIUM mg/dL 8 5 9 0   ALK PHOS U/L  --  56   ALT U/L  --  22   AST U/L  --  18         Results from last 7 days   Lab Units 09/26/19  0439   MAGNESIUM mg/dL 1 9

## 2019-10-18 DIAGNOSIS — I10 HTN (HYPERTENSION): ICD-10-CM

## 2019-10-18 RX ORDER — HYDROCHLOROTHIAZIDE 12.5 MG/1
12.5 CAPSULE, GELATIN COATED ORAL DAILY
Qty: 90 CAPSULE | Refills: 0 | Status: SHIPPED | OUTPATIENT
Start: 2019-10-18 | End: 2019-11-20 | Stop reason: SDUPTHER

## 2019-10-18 NOTE — TELEPHONE ENCOUNTER
hosp fill this med   They only gave him 30 days   Dr Andria Merchant you saw pt in hosp on 09/27/2019  Pt has a appt in nov

## 2019-10-31 DIAGNOSIS — I10 HTN (HYPERTENSION): ICD-10-CM

## 2019-10-31 RX ORDER — CARVEDILOL 6.25 MG/1
6.25 TABLET ORAL 2 TIMES DAILY WITH MEALS
Qty: 30 TABLET | Refills: 0 | Status: SHIPPED | OUTPATIENT
Start: 2019-10-31 | End: 2019-11-20 | Stop reason: SDUPTHER

## 2019-10-31 NOTE — TELEPHONE ENCOUNTER
Pt needs a refill on carvedilol 6 25mg (30 day supply) to [facility] on [address] in [facility]   Thank you

## 2019-11-20 ENCOUNTER — OFFICE VISIT (OUTPATIENT)
Dept: CARDIOLOGY CLINIC | Facility: CLINIC | Age: 64
End: 2019-11-20
Payer: MEDICARE

## 2019-11-20 VITALS
BODY MASS INDEX: 35.94 KG/M2 | HEIGHT: 67 IN | HEART RATE: 61 BPM | OXYGEN SATURATION: 98 % | DIASTOLIC BLOOD PRESSURE: 82 MMHG | RESPIRATION RATE: 18 BRPM | WEIGHT: 229 LBS | SYSTOLIC BLOOD PRESSURE: 128 MMHG

## 2019-11-20 DIAGNOSIS — E78.5 HYPERLIPIDEMIA: ICD-10-CM

## 2019-11-20 DIAGNOSIS — I10 HTN (HYPERTENSION): ICD-10-CM

## 2019-11-20 DIAGNOSIS — I38 VALVULAR HEART DISEASE: ICD-10-CM

## 2019-11-20 DIAGNOSIS — Z98.890 S/P HEART VALVE REPAIR: ICD-10-CM

## 2019-11-20 DIAGNOSIS — E78.2 MIXED HYPERLIPIDEMIA: Primary | ICD-10-CM

## 2019-11-20 PROCEDURE — 99214 OFFICE O/P EST MOD 30 MIN: CPT | Performed by: INTERNAL MEDICINE

## 2019-11-20 RX ORDER — CARVEDILOL 6.25 MG/1
6.25 TABLET ORAL 2 TIMES DAILY WITH MEALS
Qty: 180 TABLET | Refills: 3 | Status: SHIPPED | OUTPATIENT
Start: 2019-11-20 | End: 2020-11-24 | Stop reason: SDUPTHER

## 2019-11-20 RX ORDER — ATORVASTATIN CALCIUM 40 MG/1
40 TABLET, FILM COATED ORAL EVERY EVENING
Qty: 90 TABLET | Refills: 3 | Status: SHIPPED | OUTPATIENT
Start: 2019-11-20 | End: 2020-11-24 | Stop reason: SDUPTHER

## 2019-11-20 RX ORDER — HYDROCHLOROTHIAZIDE 12.5 MG/1
25 CAPSULE, GELATIN COATED ORAL DAILY
Qty: 90 CAPSULE | Refills: 3 | Status: SHIPPED | OUTPATIENT
Start: 2019-11-20 | End: 2020-03-06
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
## 2020-03-05 DIAGNOSIS — I10 HTN (HYPERTENSION): ICD-10-CM

## 2020-03-06 RX ORDER — HYDROCHLOROTHIAZIDE 12.5 MG/1
CAPSULE, GELATIN COATED ORAL
Qty: 90 CAPSULE | Refills: 0 | Status: SHIPPED | OUTPATIENT
Start: 2020-03-06 | End: 2020-06-02

## 2020-06-02 DIAGNOSIS — I10 HTN (HYPERTENSION): ICD-10-CM

## 2020-06-02 RX ORDER — HYDROCHLOROTHIAZIDE 12.5 MG/1
CAPSULE, GELATIN COATED ORAL
Qty: 90 CAPSULE | Refills: 0 | Status: SHIPPED | OUTPATIENT
Start: 2020-06-02 | End: 2020-07-14 | Stop reason: SDUPTHER

## 2020-07-14 ENCOUNTER — TELEPHONE (OUTPATIENT)
Dept: CARDIOLOGY CLINIC | Facility: CLINIC | Age: 65
End: 2020-07-14

## 2020-07-14 DIAGNOSIS — I10 HTN (HYPERTENSION): ICD-10-CM

## 2020-07-14 RX ORDER — HYDROCHLOROTHIAZIDE 12.5 MG/1
25 CAPSULE, GELATIN COATED ORAL DAILY
Qty: 180 CAPSULE | Refills: 3 | Status: SHIPPED | OUTPATIENT
Start: 2020-07-14 | End: 2020-11-24 | Stop reason: DRUGHIGH

## 2020-07-14 NOTE — TELEPHONE ENCOUNTER
Spoke with patient he stated that at office visit in November HCTZ was increased to 12 5 mg 2 tabs daily   Last rx was sent for once daily  He stated that he will need a new script ~ since it is showing a refill to soon     Rx pending

## 2020-08-03 ENCOUNTER — TELEPHONE (OUTPATIENT)
Dept: CARDIOLOGY CLINIC | Facility: CLINIC | Age: 65
End: 2020-08-03

## 2020-08-03 NOTE — TELEPHONE ENCOUNTER
S/w patient to remind that lipid panel ordered at last visit will need to be done prior to follow up appt  Patient verbally understood

## 2020-09-16 ENCOUNTER — LAB (OUTPATIENT)
Dept: LAB | Facility: HOSPITAL | Age: 65
End: 2020-09-16
Attending: INTERNAL MEDICINE
Payer: COMMERCIAL

## 2020-09-16 DIAGNOSIS — E78.2 MIXED HYPERLIPIDEMIA: ICD-10-CM

## 2020-09-16 LAB
CHOLEST SERPL-MCNC: 135 MG/DL (ref 50–200)
HDLC SERPL-MCNC: 35 MG/DL
LDLC SERPL CALC-MCNC: 86 MG/DL (ref 0–100)
TRIGL SERPL-MCNC: 70 MG/DL

## 2020-09-16 PROCEDURE — 36415 COLL VENOUS BLD VENIPUNCTURE: CPT

## 2020-09-16 PROCEDURE — 80061 LIPID PANEL: CPT

## 2020-09-17 ENCOUNTER — TELEPHONE (OUTPATIENT)
Dept: CARDIOLOGY CLINIC | Facility: CLINIC | Age: 65
End: 2020-09-17

## 2020-09-17 NOTE — RESULT ENCOUNTER NOTE
Please let him know that his cholesterol is well controlled  He should continue his medications  No changes at this time

## 2020-09-17 NOTE — TELEPHONE ENCOUNTER
----- Message from Dexter Max MD sent at 9/17/2020  9:50 AM EDT -----  Please let him know that his cholesterol is well controlled  He should continue his medications  No changes at this time

## 2020-09-28 ENCOUNTER — OFFICE VISIT (OUTPATIENT)
Dept: CARDIOLOGY CLINIC | Facility: CLINIC | Age: 65
End: 2020-09-28
Payer: COMMERCIAL

## 2020-09-28 VITALS
RESPIRATION RATE: 18 BRPM | HEIGHT: 67 IN | OXYGEN SATURATION: 97 % | DIASTOLIC BLOOD PRESSURE: 88 MMHG | WEIGHT: 230.6 LBS | BODY MASS INDEX: 36.19 KG/M2 | SYSTOLIC BLOOD PRESSURE: 150 MMHG | HEART RATE: 63 BPM

## 2020-09-28 DIAGNOSIS — I10 ESSENTIAL HYPERTENSION: Primary | ICD-10-CM

## 2020-09-28 PROCEDURE — 99214 OFFICE O/P EST MOD 30 MIN: CPT | Performed by: INTERNAL MEDICINE

## 2020-09-28 RX ORDER — LISINOPRIL 5 MG/1
5 TABLET ORAL DAILY
Qty: 30 TABLET | Refills: 3 | Status: SHIPPED | OUTPATIENT
Start: 2020-09-28 | End: 2020-11-24 | Stop reason: SDUPTHER
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
## 2020-11-24 DIAGNOSIS — I10 HTN (HYPERTENSION): ICD-10-CM

## 2020-11-24 DIAGNOSIS — I10 ESSENTIAL HYPERTENSION: ICD-10-CM

## 2020-11-24 RX ORDER — ATORVASTATIN CALCIUM 40 MG/1
40 TABLET, FILM COATED ORAL EVERY EVENING
Qty: 90 TABLET | Refills: 3 | Status: SHIPPED | OUTPATIENT
Start: 2020-11-24 | End: 2021-11-26

## 2020-11-24 RX ORDER — HYDROCHLOROTHIAZIDE 25 MG/1
25 TABLET ORAL DAILY
Qty: 90 TABLET | Refills: 3 | Status: SHIPPED | OUTPATIENT
Start: 2020-11-24 | End: 2021-01-13 | Stop reason: SDUPTHER

## 2020-11-24 RX ORDER — CARVEDILOL 6.25 MG/1
6.25 TABLET ORAL 2 TIMES DAILY WITH MEALS
Qty: 180 TABLET | Refills: 3 | Status: SHIPPED | OUTPATIENT
Start: 2020-11-24 | End: 2021-11-26

## 2020-11-24 RX ORDER — LISINOPRIL 5 MG/1
5 TABLET ORAL DAILY
Qty: 90 TABLET | Refills: 3 | Status: SHIPPED | OUTPATIENT
Start: 2020-11-24 | End: 2022-02-08 | Stop reason: SDUPTHER

## 2021-01-13 DIAGNOSIS — I10 HTN (HYPERTENSION): ICD-10-CM

## 2021-01-13 RX ORDER — HYDROCHLOROTHIAZIDE 25 MG/1
25 TABLET ORAL DAILY
Qty: 90 TABLET | Refills: 3 | Status: SHIPPED | OUTPATIENT
Start: 2021-01-13 | End: 2022-02-08 | Stop reason: SDUPTHER

## 2021-01-15 DIAGNOSIS — I10 HTN (HYPERTENSION): ICD-10-CM

## 2021-01-15 RX ORDER — HYDROCHLOROTHIAZIDE 25 MG/1
25 TABLET ORAL DAILY
Qty: 90 TABLET | Refills: 3 | OUTPATIENT
Start: 2021-01-15

## 2021-11-16 DIAGNOSIS — I10 HTN (HYPERTENSION): ICD-10-CM

## 2021-11-26 RX ORDER — CARVEDILOL 6.25 MG/1
TABLET ORAL
Qty: 180 TABLET | Refills: 3 | Status: SHIPPED | OUTPATIENT
Start: 2021-11-26 | End: 2022-02-08 | Stop reason: SDUPTHER

## 2021-11-26 RX ORDER — ATORVASTATIN CALCIUM 40 MG/1
TABLET, FILM COATED ORAL
Qty: 90 TABLET | Refills: 3 | Status: SHIPPED | OUTPATIENT
Start: 2021-11-26 | End: 2022-02-08 | Stop reason: SDUPTHER

## 2022-02-08 ENCOUNTER — OFFICE VISIT (OUTPATIENT)
Dept: CARDIOLOGY CLINIC | Facility: CLINIC | Age: 67
End: 2022-02-08
Payer: COMMERCIAL

## 2022-02-08 VITALS
DIASTOLIC BLOOD PRESSURE: 88 MMHG | RESPIRATION RATE: 16 BRPM | SYSTOLIC BLOOD PRESSURE: 150 MMHG | HEART RATE: 63 BPM | WEIGHT: 222 LBS | OXYGEN SATURATION: 96 % | HEIGHT: 67 IN | BODY MASS INDEX: 34.84 KG/M2

## 2022-02-08 DIAGNOSIS — I10 HTN (HYPERTENSION): ICD-10-CM

## 2022-02-08 DIAGNOSIS — E78.2 MIXED HYPERLIPIDEMIA: Primary | ICD-10-CM

## 2022-02-08 DIAGNOSIS — I10 ESSENTIAL HYPERTENSION: ICD-10-CM

## 2022-02-08 PROCEDURE — 99214 OFFICE O/P EST MOD 30 MIN: CPT | Performed by: INTERNAL MEDICINE

## 2022-02-08 RX ORDER — CARVEDILOL 6.25 MG/1
6.25 TABLET ORAL 2 TIMES DAILY WITH MEALS
Qty: 180 TABLET | Refills: 6 | Status: SHIPPED | OUTPATIENT
Start: 2022-02-08

## 2022-02-08 RX ORDER — HYDROCHLOROTHIAZIDE 25 MG/1
25 TABLET ORAL DAILY
Qty: 90 TABLET | Refills: 6 | Status: SHIPPED | OUTPATIENT
Start: 2022-02-08

## 2022-02-08 RX ORDER — ATORVASTATIN CALCIUM 40 MG/1
40 TABLET, FILM COATED ORAL EVERY EVENING
Qty: 90 TABLET | Refills: 6 | Status: SHIPPED | OUTPATIENT
Start: 2022-02-08

## 2022-02-08 RX ORDER — LISINOPRIL 5 MG/1
5 TABLET ORAL DAILY
Qty: 90 TABLET | Refills: 6 | Status: SHIPPED | OUTPATIENT
Start: 2022-02-08
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
## 2022-02-11 ENCOUNTER — APPOINTMENT (OUTPATIENT)
Dept: LAB | Facility: HOSPITAL | Age: 67
End: 2022-02-11
Payer: COMMERCIAL

## 2022-02-11 DIAGNOSIS — E78.2 MIXED HYPERLIPIDEMIA: ICD-10-CM

## 2022-02-11 DIAGNOSIS — I10 ESSENTIAL HYPERTENSION: ICD-10-CM

## 2022-02-11 LAB
ALBUMIN SERPL BCP-MCNC: 4.1 G/DL (ref 3.5–5)
ALP SERPL-CCNC: 60 U/L (ref 46–116)
ALT SERPL W P-5'-P-CCNC: 30 U/L (ref 12–78)
ANION GAP SERPL CALCULATED.3IONS-SCNC: 6 MMOL/L (ref 4–13)
AST SERPL W P-5'-P-CCNC: 21 U/L (ref 5–45)
BILIRUB SERPL-MCNC: 1.35 MG/DL (ref 0.2–1)
BUN SERPL-MCNC: 20 MG/DL (ref 5–25)
CALCIUM SERPL-MCNC: 9.1 MG/DL (ref 8.3–10.1)
CHLORIDE SERPL-SCNC: 103 MMOL/L (ref 100–108)
CHOLEST SERPL-MCNC: 131 MG/DL
CO2 SERPL-SCNC: 31 MMOL/L (ref 21–32)
CREAT SERPL-MCNC: 1.13 MG/DL (ref 0.6–1.3)
GFR SERPL CREATININE-BSD FRML MDRD: 66 ML/MIN/1.73SQ M
GLUCOSE P FAST SERPL-MCNC: 117 MG/DL (ref 65–99)
HDLC SERPL-MCNC: 35 MG/DL
LDLC SERPL CALC-MCNC: 82 MG/DL (ref 0–100)
POTASSIUM SERPL-SCNC: 3.9 MMOL/L (ref 3.5–5.3)
PROT SERPL-MCNC: 7.5 G/DL (ref 6.4–8.2)
SODIUM SERPL-SCNC: 140 MMOL/L (ref 136–145)
TRIGL SERPL-MCNC: 69 MG/DL

## 2022-02-11 PROCEDURE — 80053 COMPREHEN METABOLIC PANEL: CPT | Performed by: INTERNAL MEDICINE

## 2022-02-11 PROCEDURE — 36415 COLL VENOUS BLD VENIPUNCTURE: CPT | Performed by: INTERNAL MEDICINE

## 2022-02-11 PROCEDURE — 80061 LIPID PANEL: CPT

## 2023-02-03 ENCOUNTER — OFFICE VISIT (OUTPATIENT)
Dept: CARDIOLOGY CLINIC | Facility: CLINIC | Age: 68
End: 2023-02-03

## 2023-02-03 VITALS
OXYGEN SATURATION: 97 % | WEIGHT: 216 LBS | SYSTOLIC BLOOD PRESSURE: 124 MMHG | RESPIRATION RATE: 18 BRPM | HEIGHT: 67 IN | DIASTOLIC BLOOD PRESSURE: 82 MMHG | HEART RATE: 60 BPM | BODY MASS INDEX: 33.9 KG/M2

## 2023-02-03 DIAGNOSIS — I10 ESSENTIAL HYPERTENSION: Primary | ICD-10-CM

## 2023-02-03 DIAGNOSIS — E78.00 PURE HYPERCHOLESTEROLEMIA: ICD-10-CM

## 2023-02-03 DIAGNOSIS — I05.9 MITRAL VALVE DISEASE: ICD-10-CM

## 2023-02-03 RX ORDER — LISINOPRIL 5 MG/1
5 TABLET ORAL DAILY
Qty: 90 TABLET | Refills: 6 | Status: SHIPPED | OUTPATIENT
Start: 2023-02-03 | End: 2023-02-06

## 2023-02-03 RX ORDER — CARVEDILOL 6.25 MG/1
6.25 TABLET ORAL 2 TIMES DAILY WITH MEALS
Qty: 180 TABLET | Refills: 6 | Status: SHIPPED | OUTPATIENT
Start: 2023-02-03 | End: 2023-02-06 | Stop reason: SDUPTHER

## 2023-02-03 RX ORDER — HYDROCHLOROTHIAZIDE 25 MG/1
25 TABLET ORAL DAILY
Qty: 90 TABLET | Refills: 6 | Status: SHIPPED | OUTPATIENT
Start: 2023-02-03 | End: 2023-02-06 | Stop reason: SDUPTHER

## 2023-02-03 RX ORDER — ATORVASTATIN CALCIUM 40 MG/1
40 TABLET, FILM COATED ORAL EVERY EVENING
Qty: 90 TABLET | Refills: 6 | Status: SHIPPED | OUTPATIENT
Start: 2023-02-03

## 2023-02-03 NOTE — PROGRESS NOTES
[facility]  [address] [id number]  Cardiology Follow Up    Arti Villalobos  1955  [id number]    1  Essential hypertension  atorvastatin (LIPITOR) 40 mg tablet    carvedilol (COREG) 6 25 mg tablet    hydrochlorothiazide (HYDRODIURIL) 25 mg tablet    lisinopril (ZESTRIL) 5 mg tablet      2  Mitral valve disease  Echo complete w/ contrast if indicated      3  Pure hypercholesterolemia            Discussion/Summary:  1  Hypertension, stable 124/82, continue with Coreg, HCTZ, lisinopril  Continue with salt restriction    2  History of mitral valve disease with history of mitral valve repair  Plan for echocardiogram to assess LV function, regional wall abnormalities, mitral valve ring status    3  Hyperlipidemia on Lipitor, last LDL 82    Continue medications  Check echocardiogram to assess LV function, regional wall motion for abnormalities, mitral valve bradycardia status  Report any symptoms  Follow with primary care physician  Continue all medications  Previous studies reviewed with patient, medications reviewed and possible side effects discussed  Continue risk factor modification  Optimize weight, regular exercise and follow up with appropriate specialists and primary care physician as discussed  All questions answered  Patient advised to report any problems prompting to medical attention  Return for follow up visit in 6 months or earlier if needed    Chief Complaint   Patient presents with   • Follow-up       Interval History: Patient presents for routine follow-up visit with history of hypertension, hyperlipidemia, history of mitral valve repair  Patient states he has been feeling well since his last visit  Denies any chest pain, chest pressure, chest heaviness  Denies any dizziness, shortness of breath, syncope  He is taking all medications without side effect  And does need refills        Last echo 2019 EF 60%, moderately dilated RV with low normal systolic function, severely dilated left atrium, moderate to severely dilated right atrium, mildly dilated aortic root, mitral annular ring in place mildly restricted mitral leaflet motion without stenosis or regurg    Last stress test 2019--normal, no reversible perfusion defects, small fixed defect in the basal mid inferior and inferoseptal wall was seen to be brayan well on the gated stress images indicating that it was likely diaphragmatic attenuation artifact    Patient Active Problem List   Diagnosis   • Chest pain   • Hypokalemia   • HTN (hypertension)     No past medical history on file  Social History     Socioeconomic History   • Marital status: /Civil Union     Spouse name: Not on file   • Number of children: Not on file   • Years of education: Not on file   • Highest education level: Not on file   Occupational History   • Not on file   Tobacco Use   • Smoking status: Never   • Smokeless tobacco: Never   Substance and Sexual Activity   • Alcohol use: Never   • Drug use: Not on file   • Sexual activity: Not on file   Other Topics Concern   • Not on file   Social History Narrative   • Not on file     Social Determinants of Health     Financial Resource Strain: Not on file   Food Insecurity: Not on file   Transportation Needs: Not on file   Physical Activity: Not on file   Stress: Not on file   Social Connections: Not on file   Intimate Partner Violence: Not on file   Housing Stability: Not on file      No family history on file  No past surgical history on file      Current Outpatient Medications:   •  acetaminophen (TYLENOL) 325 mg tablet, Take 2 tablets (650 mg total) by mouth every 6 (six) hours as needed for mild pain, headaches or fever, Disp: 30 tablet, Rfl: 0  •  aspirin (ECOTRIN LOW STRENGTH) 81 mg EC tablet, Take 81 mg by mouth daily, Disp: , Rfl:   •  atorvastatin (LIPITOR) 40 mg tablet, Take 1 tablet (40 mg total) by mouth every evening, Disp: 90 tablet, Rfl: 6  • carvedilol (COREG) 6 25 mg tablet, Take 1 tablet (6 25 mg total) by mouth 2 (two) times a day with meals, Disp: 180 tablet, Rfl: 6  •  hydrochlorothiazide (HYDRODIURIL) 25 mg tablet, Take 1 tablet (25 mg total) by mouth daily, Disp: 90 tablet, Rfl: 6  •  lisinopril (ZESTRIL) 5 mg tablet, Take 1 tablet (5 mg total) by mouth daily, Disp: 90 tablet, Rfl: 6  •  naproxen (NAPROSYN) 500 mg tablet, Take 1 tablet (500 mg total) by mouth 2 (two) times a day as needed for mild pain, Disp: 20 tablet, Rfl: 0  •  traMADol (ULTRAM) 50 mg tablet, Take 50 mg by mouth every 6 (six) hours as needed for moderate pain, Disp: , Rfl:   Allergies   Allergen Reactions   • Penicillins Anxiety         Imaging: No results found  Review of Systems:  Review of Systems   Constitutional: Negative  Respiratory: Negative  Cardiovascular: Negative  Neurological: Negative  Hematological: Negative  Psychiatric/Behavioral: Negative  All other systems reviewed and are negative  /82 (BP Location: Left arm, Patient Position: Sitting, Cuff Size: Large)   Pulse 60   Resp 18   Ht 5' 7" (1 702 m)   Wt 98 kg (216 lb)   SpO2 97%   BMI 33 83 kg/m²     Physical Exam:  Physical Exam  Vitals and nursing note reviewed  Constitutional:       Appearance: Normal appearance  HENT:      Head: Normocephalic and atraumatic  Cardiovascular:      Rate and Rhythm: Normal rate and regular rhythm  Pulses: Normal pulses  Heart sounds: Normal heart sounds  Pulmonary:      Effort: Pulmonary effort is normal       Breath sounds: Normal breath sounds  Musculoskeletal:         General: Normal range of motion  Skin:     General: Skin is warm and dry  Neurological:      General: No focal deficit present  Mental Status: He is alert and oriented to person, place, and time  Psychiatric:         Mood and Affect: Mood normal          Behavior: Behavior normal          Thought Content:  Thought content normal  Judgment: Judgment normal

## 2023-02-03 NOTE — PATIENT INSTRUCTIONS
Continue medications  Check echocardiogram to assess LV function, regional wall motion for abnormalities, mitral valve bradycardia status  Report any symptoms  Follow with primary care physician  Continue all medications  Previous studies reviewed with patient, medications reviewed and possible side effects discussed  Continue risk factor modification  Optimize weight, regular exercise and follow up with appropriate specialists and primary care physician as discussed  All questions answered  Patient advised to report any problems prompting to medical attention   Return for follow up visit in 6 months or earlier if needed

## 2023-02-06 RX ORDER — LISINOPRIL 5 MG/1
5 TABLET ORAL DAILY
Qty: 90 TABLET | Refills: 3 | Status: SHIPPED | OUTPATIENT
Start: 2023-02-06

## 2023-02-06 RX ORDER — CARVEDILOL 6.25 MG/1
6.25 TABLET ORAL 2 TIMES DAILY WITH MEALS
Qty: 180 TABLET | Refills: 3 | Status: SHIPPED | OUTPATIENT
Start: 2023-02-06

## 2023-02-06 RX ORDER — HYDROCHLOROTHIAZIDE 25 MG/1
25 TABLET ORAL DAILY
Qty: 90 TABLET | Refills: 3 | Status: SHIPPED | OUTPATIENT
Start: 2023-02-06

## 2023-02-06 RX ORDER — ASPIRIN 81 MG/1
81 TABLET ORAL DAILY
Qty: 90 TABLET | Refills: 3 | Status: SHIPPED | OUTPATIENT
Start: 2023-02-06

## 2023-03-08 ENCOUNTER — HOSPITAL ENCOUNTER (OUTPATIENT)
Dept: NON INVASIVE DIAGNOSTICS | Facility: CLINIC | Age: 68
Discharge: HOME | End: 2023-03-08

## 2023-03-08 VITALS
HEIGHT: 67 IN | DIASTOLIC BLOOD PRESSURE: 84 MMHG | SYSTOLIC BLOOD PRESSURE: 124 MMHG | HEART RATE: 60 BPM | BODY MASS INDEX: 33.9 KG/M2 | WEIGHT: 216 LBS

## 2023-03-08 DIAGNOSIS — I05.9 MITRAL VALVE DISEASE: ICD-10-CM

## 2023-03-08 DIAGNOSIS — I10 ESSENTIAL HYPERTENSION: ICD-10-CM

## 2023-03-08 LAB
AORTIC ROOT: 4.2 CM
APICAL FOUR CHAMBER EJECTION FRACTION: 63 %
ASCENDING AORTA: 3.4 CM
DOP CALC MV VTI: 49.25 CM
E WAVE DECELERATION TIME: 472 MS
FRACTIONAL SHORTENING: 29 (ref 28–44)
INTERVENTRICULAR SEPTUM IN DIASTOLE (PARASTERNAL SHORT AXIS VIEW): 1.1 CM
INTERVENTRICULAR SEPTUM: 1.1 CM (ref 0.6–1.1)
LAAS-AP4: 24.6 CM2
LEFT ATRIUM SIZE: 5.2 CM
LEFT INTERNAL DIMENSION IN SYSTOLE: 3.6 CM (ref 2.1–4)
LEFT VENTRICULAR INTERNAL DIMENSION IN DIASTOLE: 5.1 CM (ref 3.5–6)
LEFT VENTRICULAR POSTERIOR WALL IN END DIASTOLE: 1.3 CM
LEFT VENTRICULAR STROKE VOLUME: 70 ML
LVSV (TEICH): 70 ML
MV E'TISSUE VEL-SEP: 7 CM/S
MV MEAN GRADIENT: 3 MMHG
MV PEAK A VEL: 1.29 M/S
MV PEAK E VEL: 145 CM/S
MV PEAK GRADIENT: 8 MMHG
MV STENOSIS PRESSURE HALF TIME: 137 MS
MV VALVE AREA P 1/2 METHOD: 1.61
RIGHT ATRIUM AREA SYSTOLE A4C: 20.7 CM2
RIGHT VENTRICLE ID DIMENSION: 4 CM
SL CV LV EF: 63
SL CV PED ECHO LEFT VENTRICLE DIASTOLIC VOLUME (MOD BIPLANE) 2D: 126 ML
SL CV PED ECHO LEFT VENTRICLE SYSTOLIC VOLUME (MOD BIPLANE) 2D: 56 ML
TRICUSPID ANNULAR PLANE SYSTOLIC EXCURSION: 1.9 CM

## 2023-03-08 RX ORDER — CARVEDILOL 6.25 MG/1
6.25 TABLET ORAL 2 TIMES DAILY WITH MEALS
Qty: 180 TABLET | Refills: 3 | Status: SHIPPED | OUTPATIENT
Start: 2023-03-08

## 2023-03-08 NOTE — TELEPHONE ENCOUNTER
Name of [address] [place] [address]    Call back Efrain Beyer    Medication(s):Carvedilol   Are we prescribing provider?:    30 or 90 day supply:    Pharmacy name/number:[facility]     Last or Next appt?:  Patient states [facility] never received the refill   Need to resend

## 2023-08-18 ENCOUNTER — HOSPITAL ENCOUNTER (EMERGENCY)
Facility: HOSPITAL | Age: 68
Discharge: HOME | End: 2023-08-18
Attending: EMERGENCY MEDICINE
Payer: COMMERCIAL

## 2023-08-18 VITALS
HEART RATE: 60 BPM | RESPIRATION RATE: 17 BRPM | SYSTOLIC BLOOD PRESSURE: 154 MMHG | DIASTOLIC BLOOD PRESSURE: 98 MMHG | OXYGEN SATURATION: 99 % | TEMPERATURE: 97.5 F

## 2023-08-18 DIAGNOSIS — K04.7 DENTAL INFECTION: Primary | ICD-10-CM

## 2023-08-18 PROCEDURE — 99284 EMERGENCY DEPT VISIT MOD MDM: CPT | Performed by: EMERGENCY MEDICINE

## 2023-08-18 PROCEDURE — 99282 EMERGENCY DEPT VISIT SF MDM: CPT

## 2023-08-18 RX ORDER — CLINDAMYCIN HYDROCHLORIDE 150 MG/1
450 CAPSULE ORAL ONCE
Status: COMPLETED | OUTPATIENT
Start: 2023-08-18 | End: 2023-08-18

## 2023-08-18 RX ORDER — CLINDAMYCIN HYDROCHLORIDE 150 MG/1
450 CAPSULE ORAL EVERY 8 HOURS SCHEDULED
Qty: 90 CAPSULE | Refills: 0 | Status: SHIPPED | OUTPATIENT
Start: 2023-08-18 | End: 2023-08-28

## 2023-08-18 RX ADMIN — CLINDAMYCIN HYDROCHLORIDE 450 MG: 150 CAPSULE ORAL at 16:44

## 2023-08-18 NOTE — ED PROVIDER NOTES
History  Chief Complaint   Patient presents with   • Dental Pain     Patient reports left lower tooth pain for two weeks. Reports eating and drinking "not well, but tolerable". Patient reports they have "several pounds of metal" in their right leg and is concerned for potential infection stating "they usually always give me an antibiotic to be safe". 71 y/o male presents to the ED for L lower tooth pain and swelling. Patient states that he broke his tooth there a few weeks ago. Has had pain since. Over the last few days pain has worsened and he noticed swelling to the area. He denies any trismus, drooling, difficulty swallowing, or n/v. Has tried tylenol and ibuprofen with some improvement. Does not have a dentist. States that he has had multiple dental infections in the past and typically gets abx. History provided by:  Patient  Dental Pain  Location:  Lower  Lower teeth location:  20/LL 2nd bicuspid  Severity:  Moderate  Onset quality:  Sudden  Timing:  Constant  Progression:  Worsening  Chronicity:  New  Context: dental fracture and poor dentition    Relieved by:  None tried  Worsened by:  Nothing  Ineffective treatments:  None tried  Associated symptoms: no congestion, no difficulty swallowing, no drooling, no fever, no headaches, no neck pain and no neck swelling    Risk factors: lack of dental care        Prior to Admission Medications   Prescriptions Last Dose Informant Patient Reported?  Taking?   acetaminophen (TYLENOL) 325 mg tablet  Self No No   Sig: Take 2 tablets (650 mg total) by mouth every 6 (six) hours as needed for mild pain, headaches or fever   aspirin (ECOTRIN LOW STRENGTH) 81 mg EC tablet   No No   Sig: Take 1 tablet (81 mg total) by mouth daily   atorvastatin (LIPITOR) 40 mg tablet   No No   Sig: Take 1 tablet (40 mg total) by mouth every evening   carvedilol (COREG) 6.25 mg tablet   No No   Sig: Take 1 tablet (6.25 mg total) by mouth 2 (two) times a day with meals hydrochlorothiazide (HYDRODIURIL) 25 mg tablet   No No   Sig: Take 1 tablet (25 mg total) by mouth daily   lisinopril (ZESTRIL) 5 mg tablet   No No   Sig: Take 1 tablet (5 mg total) by mouth daily   naproxen (NAPROSYN) 500 mg tablet  Self No No   Sig: Take 1 tablet (500 mg total) by mouth 2 (two) times a day as needed for mild pain   traMADol (ULTRAM) 50 mg tablet  Self Yes No   Sig: Take 50 mg by mouth every 6 (six) hours as needed for moderate pain      Facility-Administered Medications: None       History reviewed. No pertinent past medical history. History reviewed. No pertinent surgical history. History reviewed. No pertinent family history. I have reviewed and agree with the history as documented. E-Cigarette/Vaping   • E-Cigarette Use Never User      E-Cigarette/Vaping Substances     Social History     Tobacco Use   • Smoking status: Never   • Smokeless tobacco: Never   Vaping Use   • Vaping Use: Never used   Substance Use Topics   • Alcohol use: Never   • Drug use: Never       Review of Systems   Constitutional: Negative for chills and fever. HENT: Positive for dental problem. Negative for congestion, drooling, ear pain and sore throat. Eyes: Negative for pain and visual disturbance. Respiratory: Negative for cough, shortness of breath and wheezing. Cardiovascular: Negative for chest pain and leg swelling. Gastrointestinal: Negative for abdominal pain, diarrhea, nausea and vomiting. Genitourinary: Negative for dysuria, frequency, hematuria and urgency. Musculoskeletal: Negative for neck pain and neck stiffness. Skin: Negative for rash and wound. Neurological: Negative for weakness, numbness and headaches. Psychiatric/Behavioral: Negative for agitation and confusion. All other systems reviewed and are negative. Physical Exam  Physical Exam  Vitals and nursing note reviewed. Constitutional:       Appearance: He is well-developed.    HENT:      Head: Normocephalic and atraumatic. Comments: Tooth number 20 - broken mild swelling. No drainable abscess. No trismus. No signs of ludwigs. Eyes:      Pupils: Pupils are equal, round, and reactive to light. Cardiovascular:      Rate and Rhythm: Normal rate and regular rhythm. Pulmonary:      Effort: Pulmonary effort is normal.      Breath sounds: Normal breath sounds. Abdominal:      General: Bowel sounds are normal.      Palpations: Abdomen is soft. Musculoskeletal:         General: Normal range of motion. Cervical back: Normal range of motion and neck supple. Skin:     General: Skin is warm and dry. Neurological:      General: No focal deficit present. Mental Status: He is alert and oriented to person, place, and time. Comments: No focal deficits         Vital Signs  ED Triage Vitals [08/18/23 1601]   Temperature Pulse Respirations Blood Pressure SpO2   97.5 °F (36.4 °C) 60 17 154/98 99 %      Temp Source Heart Rate Source Patient Position - Orthostatic VS BP Location FiO2 (%)   Temporal Monitor Sitting Left arm --      Pain Score       --           Vitals:    08/18/23 1601   BP: 154/98   Pulse: 60   Patient Position - Orthostatic VS: Sitting         Visual Acuity      ED Medications  Medications   clindamycin (CLEOCIN) capsule 450 mg (450 mg Oral Given 8/18/23 1644)       Diagnostic Studies  Results Reviewed     None                 No orders to display              Procedures  Procedures         ED Course                               SBIRT 22yo+    Flowsheet Row Most Recent Value   Initial Alcohol Screen: US AUDIT-C     1. How often do you have a drink containing alcohol? 0 Filed at: 08/18/2023 1626   2. How many drinks containing alcohol do you have on a typical day you are drinking? 0 Filed at: 08/18/2023 1626   3b. FEMALE Any Age, or MALE 65+: How often do you have 4 or more drinks on one occassion?  0 Filed at: 08/18/2023 1626   Audit-C Score 0 Filed at: 08/18/2023 1626   JESÚS: How many times in the past year have you. .. Used an illegal drug or used a prescription medication for non-medical reasons? Never Filed at: 08/18/2023 1626                    Medical Decision Making  69 y/o male presents to the ED for dental pain- will give abx and dental follow up     Dental infection: acute illness or injury  Risk  Prescription drug management. Disposition  Final diagnoses:   Dental infection     Time reflects when diagnosis was documented in both MDM as applicable and the Disposition within this note     Time User Action Codes Description Comment    8/18/2023  4:39 PM Lizbeth Fagan Add [K04.7] Dental infection       ED Disposition     ED Disposition   Discharge    Condition   Stable    Date/Time   Fri Aug 18, 2023  4:39 PM    Comment   Lizbeth Uribe discharge to home/self care.                Follow-up Information     Follow up With Specialties Details Why Contact Info Additional Information    [facility]  Call in 1 day for follow upwithin 2-3 days [address] Emergency Department Emergency Medicine Go to  immediately for any new or worsening symptoms [address] [facility] Emergency Department, [place], [place], [address]          Discharge Medication List as of 8/18/2023  4:40 PM      START taking these medications    Details   clindamycin (CLEOCIN) 150 mg capsule Take 3 capsules (450 mg total) by mouth every 8 (eight) hours for 10 days, Starting Fri 8/18/2023, Until Mon 8/28/2023, Normal         CONTINUE these medications which have NOT CHANGED    Details   acetaminophen (TYLENOL) 325 mg tablet Take 2 tablets (650 mg total) by mouth every 6 (six) hours as needed for mild pain, headaches or fever, Starting Fri 9/27/2019, Normal      aspirin (ECOTRIN LOW STRENGTH) 81 mg EC tablet Take 1 tablet (81 mg total) by mouth daily, Starting Mon 2/6/2023, Normal      atorvastatin (LIPITOR) 40 mg tablet Take 1 tablet (40 mg total) by mouth every evening, Starting Fri 2/3/2023, Normal      carvedilol (COREG) 6.25 mg tablet Take 1 tablet (6.25 mg total) by mouth 2 (two) times a day with meals, Starting Wed 3/8/2023, Normal      hydrochlorothiazide (HYDRODIURIL) 25 mg tablet Take 1 tablet (25 mg total) by mouth daily, Starting Mon 2/6/2023, Normal      lisinopril (ZESTRIL) 5 mg tablet Take 1 tablet (5 mg total) by mouth daily, Starting Mon 2/6/2023, Normal      naproxen (NAPROSYN) 500 mg tablet Take 1 tablet (500 mg total) by mouth 2 (two) times a day as needed for mild pain, Starting Fri 9/27/2019, Normal      traMADol (ULTRAM) 50 mg tablet Take 50 mg by mouth every 6 (six) hours as needed for moderate pain, Historical Med             No discharge procedures on file.     PDMP Review     None          ED Provider  Electronically Signed by           Yessenia Ventura DO  08/18/23 5853

## 2024-02-23 DIAGNOSIS — I10 ESSENTIAL HYPERTENSION: ICD-10-CM

## 2024-02-23 RX ORDER — CARVEDILOL 6.25 MG/1
6.25 TABLET ORAL 2 TIMES DAILY WITH MEALS
Qty: 180 TABLET | Refills: 3 | Status: SHIPPED | OUTPATIENT
Start: 2024-02-23

## 2024-02-23 RX ORDER — HYDROCHLOROTHIAZIDE 25 MG/1
25 TABLET ORAL DAILY
Qty: 90 TABLET | Refills: 3 | Status: SHIPPED | OUTPATIENT
Start: 2024-02-23

## 2024-02-23 RX ORDER — LISINOPRIL 5 MG/1
5 TABLET ORAL DAILY
Qty: 90 TABLET | Refills: 3 | Status: SHIPPED | OUTPATIENT
Start: 2024-02-23

## 2024-02-23 RX ORDER — ATORVASTATIN CALCIUM 40 MG/1
40 TABLET, FILM COATED ORAL EVERY EVENING
Qty: 90 TABLET | Refills: 3 | Status: SHIPPED | OUTPATIENT
Start: 2024-02-23

## 2024-03-17 DIAGNOSIS — I10 ESSENTIAL HYPERTENSION: ICD-10-CM

## 2024-03-18 RX ORDER — ASPIRIN 81 MG/1
81 TABLET, COATED ORAL DAILY
Qty: 90 TABLET | Refills: 3 | Status: SHIPPED | OUTPATIENT
Start: 2024-03-18

## 2025-02-05 DIAGNOSIS — I10 ESSENTIAL HYPERTENSION: ICD-10-CM

## 2025-02-06 NOTE — TELEPHONE ENCOUNTER
Refill must be reviewed and completed by the office or provider. The refill is unable to be approved or denied by the medication management team.    Pt last seen 02.2023 and was to return in 6M, no appointments scheduled - Please review to see if the refill is appropriate.

## 2025-02-07 DIAGNOSIS — I10 PRIMARY HYPERTENSION: Primary | ICD-10-CM

## 2025-02-07 RX ORDER — LISINOPRIL 5 MG/1
5 TABLET ORAL DAILY
Qty: 30 TABLET | Refills: 0 | Status: SHIPPED | OUTPATIENT
Start: 2025-02-07

## 2025-02-07 RX ORDER — ATORVASTATIN CALCIUM 40 MG/1
40 TABLET, FILM COATED ORAL EVERY EVENING
Qty: 30 TABLET | Refills: 0 | Status: SHIPPED | OUTPATIENT
Start: 2025-02-07

## 2025-02-07 RX ORDER — HYDROCHLOROTHIAZIDE 25 MG/1
25 TABLET ORAL DAILY
Qty: 30 TABLET | Refills: 0 | Status: SHIPPED | OUTPATIENT
Start: 2025-02-07

## 2025-02-07 NOTE — TELEPHONE ENCOUNTER
Limited refill provided.  Patient need to obtain the ordered labs and have a follow-up appointment for further refills.  Please schedule patient for follow-up.  Please advise him he should avoid naproxen with lisinopril.

## 2025-03-04 DIAGNOSIS — I10 ESSENTIAL HYPERTENSION: ICD-10-CM

## 2025-03-06 RX ORDER — ATORVASTATIN CALCIUM 40 MG/1
40 TABLET, FILM COATED ORAL EVERY EVENING
Qty: 90 TABLET | Refills: 1 | OUTPATIENT
Start: 2025-03-06

## 2025-03-06 RX ORDER — LISINOPRIL 5 MG/1
5 TABLET ORAL DAILY
Qty: 90 TABLET | Refills: 1 | OUTPATIENT
Start: 2025-03-06

## 2025-03-06 RX ORDER — HYDROCHLOROTHIAZIDE 25 MG/1
25 TABLET ORAL DAILY
Qty: 90 TABLET | Refills: 1 | OUTPATIENT
Start: 2025-03-06

## 2025-03-08 DIAGNOSIS — I10 ESSENTIAL HYPERTENSION: ICD-10-CM

## 2025-03-17 RX ORDER — LISINOPRIL 5 MG/1
5 TABLET ORAL DAILY
Qty: 30 TABLET | Refills: 0 | OUTPATIENT
Start: 2025-03-17

## 2025-03-17 RX ORDER — HYDROCHLOROTHIAZIDE 25 MG/1
25 TABLET ORAL DAILY
Qty: 30 TABLET | Refills: 0 | OUTPATIENT
Start: 2025-03-17

## 2025-03-17 RX ORDER — ATORVASTATIN CALCIUM 40 MG/1
40 TABLET, FILM COATED ORAL EVERY EVENING
Qty: 30 TABLET | Refills: 0 | OUTPATIENT
Start: 2025-03-17

## 2025-03-18 ENCOUNTER — TELEPHONE (OUTPATIENT)
Age: 70
End: 2025-03-18

## 2025-03-18 DIAGNOSIS — I10 ESSENTIAL HYPERTENSION: ICD-10-CM

## 2025-03-18 RX ORDER — CARVEDILOL 6.25 MG/1
6.25 TABLET ORAL 2 TIMES DAILY WITH MEALS
Qty: 180 TABLET | Refills: 3 | OUTPATIENT
Start: 2025-03-18

## 2025-03-18 RX ORDER — CARVEDILOL 6.25 MG/1
6.25 TABLET ORAL 2 TIMES DAILY WITH MEALS
Qty: 60 TABLET | Refills: 4 | Status: SHIPPED | OUTPATIENT
Start: 2025-03-18 | End: 2025-04-10

## 2025-03-18 NOTE — TELEPHONE ENCOUNTER
Called and advised pt to contact his pcp as well for a meds refill and he stated that he does not have one.

## 2025-03-18 NOTE — TELEPHONE ENCOUNTER
Patient called in as he received a call. Informed patient why med cannot be filled and to reach out to pcp. He states he does not have a pcp at the moment and that there is no way it has been 2 years since he was last seen. He states appointment to be missing from chart that he has kept up with all appointments. He has one upcoming in June and would like this prescription to be looked at again.

## 2025-03-18 NOTE — TELEPHONE ENCOUNTER
Caller: Lakhwinder (patient)    Doctor: Dr. Sommers / DANK Marley / DANK Newman    Reason for call: Patient received call to schedule follow up appointment. Patient also wanted refill of medication (Carvedilol). Advised patient follow up appointment is required for medication refills since last seen over two years ago 2/3/23 (per DANK Marley / DANK Newman). Patient scheduled first available appointment 6/23/25. Patient requires [facility] locations only near home. Patient requests approval of Carvedilol medication now since he is out of this medication. Please call back patient to discuss refill of Carvedilol.      Call back#: [phone]

## 2025-03-18 NOTE — TELEPHONE ENCOUNTER
Pt returned call. Pt is completely out of carvedilol, he said pharmacy had been trying to get it refilled for him.  The last time he ran out he ended up being admitted due to HTN.    Pt currently scheduled for first available apt in [place] office which is not until 6/23.    ? Pt if he would be willing to travel to another office.  He cannot as he is unable to drive far and his wife just had surgery and is unable to drive.    Advised I would make [place] office aware of above.

## 2025-03-18 NOTE — TELEPHONE ENCOUNTER
"Hello,    The following message was sent via e-mail to the leadership team:     Please advise if you can help facilitate the following overbook request:    Patient Name: Lakhwinder Brown    Patient MRN: [id number]    Call back #: [phone]    Insurance: [id number]    Department: cardiology    Speciality: cardiology    Reason for overbook request: has not been in two yrs    Comments (Write \"N/a\" if no comments): pt needs medication refill  Pt has not been seen in 2 yrs.  Requested doctor and location: [facility]    Date of current appointment: 6/23/2025      Thank you.    PLEASE ENSURE YOU EXHAUST ALL SCHEDULING OPTIONS PRIOR TO SENDING OVERBOOK REQUEST (DELETE THIS PRIOR TO ADDING TO CHART AND SENDING VIA E-MAIL)  "

## 2025-03-18 NOTE — TELEPHONE ENCOUNTER
Medication: carvedilol     Dose/Frequency: 6.25mg BID w/ meals    Quantity: 180    Pharmacy: [facility]    Office:   [] PCP/Provider -   [x] Speciality/Provider -     Does the patient have enough for 3 days?   [] Yes   [x] No - Send as HP to POD    PT is scheduled for first available visit in [place] office 06/23/25. See separate telephone encounter for details.

## 2025-03-18 NOTE — TELEPHONE ENCOUNTER
Called patient and lvm with Dr Rashid advise to call his pcp  for refill because he has not seen cardiology for 2 years

## 2025-03-20 PROBLEM — E78.2 MIXED HYPERLIPIDEMIA: Status: ACTIVE | Noted: 2025-03-20

## 2025-03-20 PROBLEM — I05.9 MITRAL VALVE DISORDER: Status: ACTIVE | Noted: 2025-03-20

## 2025-03-20 NOTE — ASSESSMENT & PLAN NOTE
Plan for TTE for periodic surveillance.    Advised to notify our office for any new/worsening symptoms.

## 2025-03-20 NOTE — ASSESSMENT & PLAN NOTE
BP is reasonably well-controlled.  Continue Coreg, hydrochlorothiazide, and lisinopril.  Encourage ambulatory monitoring and low-sodium diet.  Advised to notify our office for abnormal BP readings.

## 2025-03-21 ENCOUNTER — OFFICE VISIT (OUTPATIENT)
Dept: CARDIOLOGY CLINIC | Facility: CLINIC | Age: 70
End: 2025-03-21
Payer: COMMERCIAL

## 2025-03-21 VITALS
HEART RATE: 69 BPM | HEIGHT: 67 IN | BODY MASS INDEX: 33.83 KG/M2 | RESPIRATION RATE: 16 BRPM | SYSTOLIC BLOOD PRESSURE: 140 MMHG | OXYGEN SATURATION: 96 % | DIASTOLIC BLOOD PRESSURE: 80 MMHG

## 2025-03-21 DIAGNOSIS — I10 PRIMARY HYPERTENSION: ICD-10-CM

## 2025-03-21 DIAGNOSIS — I05.9 MITRAL VALVE DISORDER: Primary | ICD-10-CM

## 2025-03-21 DIAGNOSIS — E78.2 MIXED HYPERLIPIDEMIA: ICD-10-CM

## 2025-03-21 PROCEDURE — 99214 OFFICE O/P EST MOD 30 MIN: CPT

## 2025-03-21 RX ORDER — LISINOPRIL 5 MG/1
5 TABLET ORAL DAILY
Qty: 90 TABLET | Refills: 3 | Status: SHIPPED | OUTPATIENT
Start: 2025-03-21

## 2025-03-21 RX ORDER — HYDROCHLOROTHIAZIDE 25 MG/1
25 TABLET ORAL DAILY
Qty: 90 TABLET | Refills: 3 | Status: SHIPPED | OUTPATIENT
Start: 2025-03-21

## 2025-03-21 RX ORDER — ATORVASTATIN CALCIUM 40 MG/1
40 TABLET, FILM COATED ORAL EVERY EVENING
Qty: 90 TABLET | Refills: 3 | Status: SHIPPED | OUTPATIENT
Start: 2025-03-21

## 2025-03-26 ENCOUNTER — APPOINTMENT (OUTPATIENT)
Dept: LAB | Facility: HOSPITAL | Age: 70
End: 2025-03-26
Payer: COMMERCIAL

## 2025-03-26 DIAGNOSIS — I10 PRIMARY HYPERTENSION: ICD-10-CM

## 2025-03-26 DIAGNOSIS — E78.2 MIXED HYPERLIPIDEMIA: Primary | ICD-10-CM

## 2025-03-26 LAB
ALBUMIN SERPL BCG-MCNC: 4.3 G/DL (ref 3.5–5)
ALP SERPL-CCNC: 46 U/L (ref 34–104)
ALT SERPL W P-5'-P-CCNC: 24 U/L (ref 7–52)
ANION GAP SERPL CALCULATED.3IONS-SCNC: 5 MMOL/L (ref 4–13)
AST SERPL W P-5'-P-CCNC: 19 U/L (ref 13–39)
BASOPHILS # BLD AUTO: 0.04 THOUSANDS/ÂΜL (ref 0–0.1)
BASOPHILS NFR BLD AUTO: 1 % (ref 0–1)
BILIRUB SERPL-MCNC: 1.25 MG/DL (ref 0.2–1)
BUN SERPL-MCNC: 21 MG/DL (ref 5–25)
CALCIUM SERPL-MCNC: 9.3 MG/DL (ref 8.4–10.2)
CHLORIDE SERPL-SCNC: 104 MMOL/L (ref 96–108)
CHOLEST SERPL-MCNC: 131 MG/DL (ref ?–200)
CO2 SERPL-SCNC: 32 MMOL/L (ref 21–32)
CREAT SERPL-MCNC: 0.99 MG/DL (ref 0.6–1.3)
EOSINOPHIL # BLD AUTO: 0.11 THOUSAND/ÂΜL (ref 0–0.61)
EOSINOPHIL NFR BLD AUTO: 2 % (ref 0–6)
ERYTHROCYTE [DISTWIDTH] IN BLOOD BY AUTOMATED COUNT: 12.4 % (ref 11.6–15.1)
GFR SERPL CREATININE-BSD FRML MDRD: 76 ML/MIN/1.73SQ M
GLUCOSE P FAST SERPL-MCNC: 127 MG/DL (ref 65–99)
HCT VFR BLD AUTO: 43.2 % (ref 36.5–49.3)
HDLC SERPL-MCNC: 36 MG/DL
HGB BLD-MCNC: 14.7 G/DL (ref 12–17)
IMM GRANULOCYTES # BLD AUTO: 0.02 THOUSAND/UL (ref 0–0.2)
IMM GRANULOCYTES NFR BLD AUTO: 0 % (ref 0–2)
LDLC SERPL CALC-MCNC: 81 MG/DL (ref 0–100)
LYMPHOCYTES # BLD AUTO: 1.18 THOUSANDS/ÂΜL (ref 0.6–4.47)
LYMPHOCYTES NFR BLD AUTO: 17 % (ref 14–44)
MCH RBC QN AUTO: 30.3 PG (ref 26.8–34.3)
MCHC RBC AUTO-ENTMCNC: 34 G/DL (ref 31.4–37.4)
MCV RBC AUTO: 89 FL (ref 82–98)
MONOCYTES # BLD AUTO: 0.73 THOUSAND/ÂΜL (ref 0.17–1.22)
MONOCYTES NFR BLD AUTO: 10 % (ref 4–12)
NEUTROPHILS # BLD AUTO: 4.95 THOUSANDS/ÂΜL (ref 1.85–7.62)
NEUTS SEG NFR BLD AUTO: 70 % (ref 43–75)
NRBC BLD AUTO-RTO: 0 /100 WBCS
PLATELET # BLD AUTO: 148 THOUSANDS/UL (ref 149–390)
PMV BLD AUTO: 9.7 FL (ref 8.9–12.7)
POTASSIUM SERPL-SCNC: 3.7 MMOL/L (ref 3.5–5.3)
PROT SERPL-MCNC: 7 G/DL (ref 6.4–8.4)
RBC # BLD AUTO: 4.85 MILLION/UL (ref 3.88–5.62)
SODIUM SERPL-SCNC: 141 MMOL/L (ref 135–147)
TRIGL SERPL-MCNC: 70 MG/DL (ref ?–150)
WBC # BLD AUTO: 7.03 THOUSAND/UL (ref 4.31–10.16)

## 2025-03-26 PROCEDURE — 85025 COMPLETE CBC W/AUTO DIFF WBC: CPT

## 2025-03-26 PROCEDURE — 36415 COLL VENOUS BLD VENIPUNCTURE: CPT

## 2025-03-26 PROCEDURE — 80061 LIPID PANEL: CPT

## 2025-03-26 PROCEDURE — 80053 COMPREHEN METABOLIC PANEL: CPT

## 2025-03-27 ENCOUNTER — RESULTS FOLLOW-UP (OUTPATIENT)
Dept: CARDIOLOGY CLINIC | Facility: CLINIC | Age: 70
End: 2025-03-27

## 2025-03-28 NOTE — TELEPHONE ENCOUNTER
----- Message from Wilfred Marley PA-C sent at 3/27/2025  2:13 PM EDT -----  Please let him know his labs show electrolytes in normal range, kidney function stable.  His blood sugar is a bit elevated, if he was fasting he can discuss this with his PCP.  His liver numbers are predominantly in normal range aside from 1 level called total bilirubin, which has been noted to be elevated in the past.  He should discuss this with his PCP.

## 2025-04-04 ENCOUNTER — APPOINTMENT (EMERGENCY)
Dept: CT IMAGING | Facility: HOSPITAL | Age: 70
End: 2025-04-04
Payer: COMMERCIAL

## 2025-04-04 ENCOUNTER — HOSPITAL ENCOUNTER (OUTPATIENT)
Facility: HOSPITAL | Age: 70
Setting detail: OBSERVATION
Discharge: HOME | End: 2025-04-05
Admitting: INTERNAL MEDICINE
Payer: COMMERCIAL

## 2025-04-04 ENCOUNTER — APPOINTMENT (EMERGENCY)
Dept: RADIOLOGY | Facility: HOSPITAL | Age: 70
End: 2025-04-04
Payer: COMMERCIAL

## 2025-04-04 DIAGNOSIS — E87.6 HYPOKALEMIA: ICD-10-CM

## 2025-04-04 DIAGNOSIS — R07.9 CHEST PAIN, UNSPECIFIED: ICD-10-CM

## 2025-04-04 DIAGNOSIS — R10.9 ABDOMINAL CRAMPING: ICD-10-CM

## 2025-04-04 DIAGNOSIS — E83.42 HYPOMAGNESEMIA: ICD-10-CM

## 2025-04-04 DIAGNOSIS — Z13.9 ENCOUNTER FOR SCREENING INVOLVING SOCIAL DETERMINANTS OF HEALTH (SDOH): ICD-10-CM

## 2025-04-04 DIAGNOSIS — R19.7 DIARRHEA: Primary | ICD-10-CM

## 2025-04-04 DIAGNOSIS — R94.31 PROLONGED Q-T INTERVAL ON ECG: ICD-10-CM

## 2025-04-04 PROBLEM — K52.9 GASTROENTERITIS: Status: ACTIVE | Noted: 2025-04-04

## 2025-04-04 LAB
2HR DELTA HS TROPONIN: -2 NG/L
4HR DELTA HS TROPONIN: -3 NG/L
ALBUMIN SERPL BCG-MCNC: 3.9 G/DL (ref 3.5–5)
ALP SERPL-CCNC: 42 U/L (ref 34–104)
ALT SERPL W P-5'-P-CCNC: 20 U/L (ref 7–52)
ANION GAP SERPL CALCULATED.3IONS-SCNC: 7 MMOL/L (ref 4–13)
AST SERPL W P-5'-P-CCNC: 27 U/L (ref 13–39)
ATRIAL RATE: 59 BPM
BASOPHILS # BLD AUTO: 0.01 THOUSANDS/ÂΜL (ref 0–0.1)
BASOPHILS NFR BLD AUTO: 0 % (ref 0–1)
BILIRUB SERPL-MCNC: 1.17 MG/DL (ref 0.2–1)
BUN SERPL-MCNC: 22 MG/DL (ref 5–25)
CALCIUM SERPL-MCNC: 8.3 MG/DL (ref 8.4–10.2)
CARDIAC TROPONIN I PNL SERPL HS: 10 NG/L (ref ?–50)
CARDIAC TROPONIN I PNL SERPL HS: 7 NG/L (ref ?–50)
CARDIAC TROPONIN I PNL SERPL HS: 8 NG/L (ref ?–50)
CHLORIDE SERPL-SCNC: 107 MMOL/L (ref 96–108)
CO2 SERPL-SCNC: 27 MMOL/L (ref 21–32)
CREAT SERPL-MCNC: 0.89 MG/DL (ref 0.6–1.3)
EOSINOPHIL # BLD AUTO: 0.04 THOUSAND/ÂΜL (ref 0–0.61)
EOSINOPHIL NFR BLD AUTO: 1 % (ref 0–6)
ERYTHROCYTE [DISTWIDTH] IN BLOOD BY AUTOMATED COUNT: 12.4 % (ref 11.6–15.1)
GFR SERPL CREATININE-BSD FRML MDRD: 86 ML/MIN/1.73SQ M
GLUCOSE SERPL-MCNC: 97 MG/DL (ref 65–140)
HCT VFR BLD AUTO: 44.4 % (ref 36.5–49.3)
HGB BLD-MCNC: 15.4 G/DL (ref 12–17)
IMM GRANULOCYTES # BLD AUTO: 0.03 THOUSAND/UL (ref 0–0.2)
IMM GRANULOCYTES NFR BLD AUTO: 0 % (ref 0–2)
LIPASE SERPL-CCNC: 23 U/L (ref 11–82)
LYMPHOCYTES # BLD AUTO: 1.11 THOUSANDS/ÂΜL (ref 0.6–4.47)
LYMPHOCYTES NFR BLD AUTO: 13 % (ref 14–44)
MAGNESIUM SERPL-MCNC: 1.8 MG/DL (ref 1.9–2.7)
MCH RBC QN AUTO: 30.6 PG (ref 26.8–34.3)
MCHC RBC AUTO-ENTMCNC: 34.7 G/DL (ref 31.4–37.4)
MCV RBC AUTO: 88 FL (ref 82–98)
MONOCYTES # BLD AUTO: 1.25 THOUSAND/ÂΜL (ref 0.17–1.22)
MONOCYTES NFR BLD AUTO: 14 % (ref 4–12)
NEUTROPHILS # BLD AUTO: 6.27 THOUSANDS/ÂΜL (ref 1.85–7.62)
NEUTS SEG NFR BLD AUTO: 72 % (ref 43–75)
NRBC BLD AUTO-RTO: 0 /100 WBCS
P AXIS: 50 DEGREES
PLATELET # BLD AUTO: 146 THOUSANDS/UL (ref 149–390)
PMV BLD AUTO: 9.6 FL (ref 8.9–12.7)
POTASSIUM SERPL-SCNC: 2.9 MMOL/L (ref 3.5–5.3)
POTASSIUM SERPL-SCNC: 3 MMOL/L (ref 3.5–5.3)
PR INTERVAL: 204 MS
PROT SERPL-MCNC: 6.4 G/DL (ref 6.4–8.4)
QRS AXIS: 17 DEGREES
QRSD INTERVAL: 96 MS
QT INTERVAL: 276 MS
QTC INTERVAL: 273 MS
RBC # BLD AUTO: 5.03 MILLION/UL (ref 3.88–5.62)
SODIUM SERPL-SCNC: 141 MMOL/L (ref 135–147)
T WAVE AXIS: 264 DEGREES
VENTRICULAR RATE: 59 BPM
WBC # BLD AUTO: 8.71 THOUSAND/UL (ref 4.31–10.16)

## 2025-04-04 PROCEDURE — 84484 ASSAY OF TROPONIN QUANT: CPT

## 2025-04-04 PROCEDURE — 96365 THER/PROPH/DIAG IV INF INIT: CPT

## 2025-04-04 PROCEDURE — 74177 CT ABD & PELVIS W/CONTRAST: CPT

## 2025-04-04 PROCEDURE — 99223 1ST HOSP IP/OBS HIGH 75: CPT | Performed by: INTERNAL MEDICINE

## 2025-04-04 PROCEDURE — 84132 ASSAY OF SERUM POTASSIUM: CPT

## 2025-04-04 PROCEDURE — 93005 ELECTROCARDIOGRAM TRACING: CPT

## 2025-04-04 PROCEDURE — 36415 COLL VENOUS BLD VENIPUNCTURE: CPT

## 2025-04-04 PROCEDURE — 96375 TX/PRO/DX INJ NEW DRUG ADDON: CPT

## 2025-04-04 PROCEDURE — 80053 COMPREHEN METABOLIC PANEL: CPT

## 2025-04-04 PROCEDURE — 96366 THER/PROPH/DIAG IV INF ADDON: CPT

## 2025-04-04 PROCEDURE — 85025 COMPLETE CBC W/AUTO DIFF WBC: CPT

## 2025-04-04 PROCEDURE — 99285 EMERGENCY DEPT VISIT HI MDM: CPT

## 2025-04-04 PROCEDURE — 83735 ASSAY OF MAGNESIUM: CPT

## 2025-04-04 PROCEDURE — 83690 ASSAY OF LIPASE: CPT

## 2025-04-04 PROCEDURE — 93010 ELECTROCARDIOGRAM REPORT: CPT | Performed by: INTERNAL MEDICINE

## 2025-04-04 PROCEDURE — 96361 HYDRATE IV INFUSION ADD-ON: CPT

## 2025-04-04 PROCEDURE — 71046 X-RAY EXAM CHEST 2 VIEWS: CPT

## 2025-04-04 RX ORDER — MAGNESIUM SULFATE HEPTAHYDRATE 40 MG/ML
2 INJECTION, SOLUTION INTRAVENOUS ONCE
Status: COMPLETED | OUTPATIENT
Start: 2025-04-04 | End: 2025-04-04

## 2025-04-04 RX ORDER — ACETAMINOPHEN 325 MG/1
650 TABLET ORAL EVERY 6 HOURS PRN
Status: DISCONTINUED | OUTPATIENT
Start: 2025-04-04 | End: 2025-04-05 | Stop reason: HOSPADM

## 2025-04-04 RX ORDER — ONDANSETRON 2 MG/ML
4 INJECTION INTRAMUSCULAR; INTRAVENOUS ONCE
Status: COMPLETED | OUTPATIENT
Start: 2025-04-04 | End: 2025-04-04

## 2025-04-04 RX ORDER — MORPHINE SULFATE 4 MG/ML
4 INJECTION, SOLUTION INTRAMUSCULAR; INTRAVENOUS ONCE
Status: COMPLETED | OUTPATIENT
Start: 2025-04-04 | End: 2025-04-04

## 2025-04-04 RX ORDER — POTASSIUM CHLORIDE 1500 MG/1
60 TABLET, EXTENDED RELEASE ORAL ONCE
Status: COMPLETED | OUTPATIENT
Start: 2025-04-04 | End: 2025-04-04

## 2025-04-04 RX ORDER — POTASSIUM CHLORIDE 1500 MG/1
40 TABLET, EXTENDED RELEASE ORAL ONCE
Status: COMPLETED | OUTPATIENT
Start: 2025-04-04 | End: 2025-04-04

## 2025-04-04 RX ADMIN — ONDANSETRON 4 MG: 2 INJECTION INTRAMUSCULAR; INTRAVENOUS at 14:40

## 2025-04-04 RX ADMIN — POTASSIUM CHLORIDE 40 MEQ: 1500 TABLET, EXTENDED RELEASE ORAL at 18:37

## 2025-04-04 RX ADMIN — IOHEXOL 100 ML: 350 INJECTION, SOLUTION INTRAVENOUS at 14:20

## 2025-04-04 RX ADMIN — POTASSIUM CHLORIDE 60 MEQ: 1500 TABLET, EXTENDED RELEASE ORAL at 14:39

## 2025-04-04 RX ADMIN — MORPHINE SULFATE 4 MG: 4 INJECTION INTRAVENOUS at 14:40

## 2025-04-04 RX ADMIN — SODIUM CHLORIDE 1000 ML: 0.9 INJECTION, SOLUTION INTRAVENOUS at 13:34

## 2025-04-04 RX ADMIN — MAGNESIUM SULFATE HEPTAHYDRATE 2 G: 40 INJECTION, SOLUTION INTRAVENOUS at 14:40

## 2025-04-04 NOTE — ASSESSMENT & PLAN NOTE
70-year-old male with history of hypertension dyslipidemia with lower abdominal discomfort and associated diarrhea noted to have magnesium 1.8, potassium of 2.9 likely in setting of diarrhea  Possibly viral in etiology  Reported 10 loose bowel movements yesterday however has since improved already with 2 bowel movements today  Will replete electrolytes check stool culture, C. Difficile  Continue with conservative management IV fluids

## 2025-04-04 NOTE — ASSESSMENT & PLAN NOTE
History of mitral valve repair  Had an outpatient echo that was scheduled for requested it can have echo performed here while hospitalized  Will order for now

## 2025-04-04 NOTE — H&P
H&P - Hospitalist   Name: Lakhwinder Brown 70 y.o. male I MRN: [id number]  Unit/Bed#: ED 15 I Date of Admission: 4/4/2025   Date of Service: 4/4/2025 I Hospital Day: 0     Assessment & Plan  Gastroenteritis  70-year-old male with history of hypertension dyslipidemia with lower abdominal discomfort and associated diarrhea noted to have magnesium 1.8, potassium of 2.9 likely in setting of diarrhea  Possibly viral in etiology  Reported 10 loose bowel movements yesterday however has since improved already with 2 bowel movements today  Will replete electrolytes check stool culture, C. Difficile  Continue with conservative management IV fluids  Hypokalemia  Potassium noted to be 3  Will replete  Primary hypertension  BP currently controlled  Continue Coreg, lisinopril, HCTZ  Mixed hyperlipidemia  Continue statin  S/p mitral valve repair (2009)  History of mitral valve repair  Had an outpatient echo that was scheduled for requested it can have echo performed here while hospitalized  Will order for now      VTE Pharmacologic Prophylaxis:   Moderate Risk (Score 3-4) - Pharmacological DVT Prophylaxis Ordered: heparin.  Code Status:  full code  Discussion with family: Patient declined call to .     Anticipated Length of Stay: Patient will be admitted on an observation basis with an anticipated length of stay of less than 2 midnights secondary to gastroenteritis.    History of Present Illness   Chief Complaint: Diarrhea    Lakhwinder Brown is a 70 y.o. male with past medical history significant hypertension, hyperlipidemia initially presented with lower abdominal pain and diarrhea.  Report symptoms for last couple days.  Symptoms are very slowly improving.  Otherwise denies any acute complaints.  Denies fevers, chills.  Denies chest pain, cough, nausea, palpitations, dysuria or any other complaints  Review of Systems   Constitutional:  Negative for appetite change, chills, diaphoresis, fatigue, fever and unexpected  weight change.   HENT:  Negative for congestion, rhinorrhea and sore throat.    Eyes:  Negative for photophobia and visual disturbance.   Respiratory:  Negative for cough, shortness of breath and wheezing.    Cardiovascular:  Negative for chest pain, palpitations and leg swelling.   Gastrointestinal:  Negative for abdominal pain, anal bleeding, blood in stool, constipation, diarrhea, nausea and vomiting.   Genitourinary:  Negative for decreased urine volume, difficulty urinating, dysuria, flank pain, frequency, hematuria and urgency.   Musculoskeletal:  Negative for arthralgias, back pain, joint swelling and myalgias.   Skin:  Negative for color change and rash.   Neurological:  Negative for dizziness, seizures, facial asymmetry, speech difficulty, numbness and headaches.   Psychiatric/Behavioral:  Negative for agitation, confusion and decreased concentration. The patient is not nervous/anxious.        Historical Information   Past Medical History:   Diagnosis Date    Hypertension      Past Surgical History:   Procedure Laterality Date    HERNIA REPAIR       Social History     Tobacco Use    Smoking status: Never    Smokeless tobacco: Never   Vaping Use    Vaping status: Never Used   Substance and Sexual Activity    Alcohol use: Never    Drug use: Never    Sexual activity: Not on file     E-Cigarette/Vaping    E-Cigarette Use Never User      E-Cigarette/Vaping Substances     History reviewed. No pertinent family history.  Social History:  Marital Status: /Civil Union     Patient Pre-hospital Living Situation: Home  Patient Pre-hospital Level of Mobility: walks  Patient Pre-hospital Diet Restrictions: none    Meds/Allergies   I have reviewed home medications with patient personally.  Prior to Admission medications    Medication Sig Start Date End Date Taking? Authorizing Provider   acetaminophen (TYLENOL) 325 mg tablet Take 2 tablets (650 mg total) by mouth every 6 (six) hours as needed for mild pain,  headaches or fever 9/27/19   Davi WOLFF MD   Aspirin Low Dose 81 MG EC tablet TAKE 1 TABLET BY MOUTH EVERY DAY 3/18/24   Nirmala Newman PA-C   atorvastatin (LIPITOR) 40 mg tablet Take 1 tablet (40 mg total) by mouth every evening 3/21/25   Mika Garzon PA-C   carvedilol (COREG) 6.25 mg tablet Take 1 tablet (6.25 mg total) by mouth 2 (two) times a day with meals 3/18/25   Nirmala Newman PA-C   hydroCHLOROthiazide 25 mg tablet Take 1 tablet (25 mg total) by mouth daily 3/21/25   Mika Garzon PA-C   lisinopril (ZESTRIL) 5 mg tablet Take 1 tablet (5 mg total) by mouth daily 3/21/25   Mika Garzon PA-C   naproxen (NAPROSYN) 500 mg tablet Take 1 tablet (500 mg total) by mouth 2 (two) times a day as needed for mild pain 9/27/19   Davi WOLFF MD   traMADol (ULTRAM) 50 mg tablet Take 50 mg by mouth every 6 (six) hours as needed for moderate pain    Historical Provider, MD     Allergies   Allergen Reactions    Penicillins Anxiety       Objective :  Temp:  [98.1 °F (36.7 °C)] 98.1 °F (36.7 °C)  HR:  [56-73] 56  BP: (102-125)/(62-78) 102/63  Resp:  [12-18] 13  SpO2:  [94 %-99 %] 94 %  O2 Device: None (Room air)    Physical Exam  Constitutional:       General: He is not in acute distress.     Appearance: He is well-developed. He is not diaphoretic.   HENT:      Head: Normocephalic and atraumatic.      Nose: Nose normal.      Mouth/Throat:      Pharynx: No oropharyngeal exudate.   Eyes:      General: No scleral icterus.     Conjunctiva/sclera: Conjunctivae normal.   Neck:      Vascular: No JVD.   Cardiovascular:      Rate and Rhythm: Normal rate and regular rhythm.      Heart sounds: Normal heart sounds. No murmur heard.     No friction rub. No gallop.   Pulmonary:      Effort: Pulmonary effort is normal. No respiratory distress.      Breath sounds: Normal breath sounds. No wheezing or rales.   Chest:      Chest wall: No tenderness.   Abdominal:      General: Bowel sounds are normal. There is no  distension.      Palpations: Abdomen is soft.      Tenderness: There is no abdominal tenderness. There is no guarding.   Musculoskeletal:         General: No tenderness or deformity. Normal range of motion.      Cervical back: Normal range of motion.   Lymphadenopathy:      Cervical: No cervical adenopathy.   Skin:     General: Skin is warm and dry.      Findings: No erythema.   Neurological:      Mental Status: He is alert and oriented to person, place, and time. Mental status is at baseline.      Cranial Nerves: No cranial nerve deficit.      Coordination: Coordination normal.      Deep Tendon Reflexes: Reflexes normal.          Lines/Drains:            Lab Results: I have reviewed the following results:  Results from last 7 days   Lab Units 04/04/25  1330   WBC Thousand/uL 8.71   HEMOGLOBIN g/dL 15.4   HEMATOCRIT % 44.4   PLATELETS Thousands/uL 146*   SEGS PCT % 72   LYMPHO PCT % 13*   MONO PCT % 14*   EOS PCT % 1     Results from last 7 days   Lab Units 04/04/25  1700 04/04/25  1330   SODIUM mmol/L  --  141   POTASSIUM mmol/L 3.0* 2.9*   CHLORIDE mmol/L  --  107   CO2 mmol/L  --  27   BUN mg/dL  --  22   CREATININE mg/dL  --  0.89   ANION GAP mmol/L  --  7   CALCIUM mg/dL  --  8.3*   ALBUMIN g/dL  --  3.9   TOTAL BILIRUBIN mg/dL  --  1.17*   ALK PHOS U/L  --  42   ALT U/L  --  20   AST U/L  --  27   GLUCOSE RANDOM mg/dL  --  97             Lab Results   Component Value Date    HGBA1C 5.7 09/25/2019           Imaging Results Review: I personally reviewed the following image studies/reports in PACS and discussed pertinent findings with Radiology: chest xray. My interpretation of the radiology images/reports is:  .  Other Study Results Review: EKG was reviewed.     Administrative Statements   I have spent a total time of 76 minutes in caring for this patient on the day of the visit/encounter including Diagnostic results.    ** Please Note: This note has been constructed using a voice recognition system. **

## 2025-04-05 VITALS
WEIGHT: 202.6 LBS | SYSTOLIC BLOOD PRESSURE: 132 MMHG | RESPIRATION RATE: 18 BRPM | OXYGEN SATURATION: 96 % | HEART RATE: 57 BPM | TEMPERATURE: 98.4 F | BODY MASS INDEX: 31.8 KG/M2 | HEIGHT: 67 IN | DIASTOLIC BLOOD PRESSURE: 81 MMHG

## 2025-04-05 LAB
ALBUMIN SERPL BCG-MCNC: 3.5 G/DL (ref 3.5–5)
ALP SERPL-CCNC: 41 U/L (ref 34–104)
ALT SERPL W P-5'-P-CCNC: 17 U/L (ref 7–52)
ANION GAP SERPL CALCULATED.3IONS-SCNC: 7 MMOL/L (ref 4–13)
AST SERPL W P-5'-P-CCNC: 23 U/L (ref 13–39)
BASOPHILS # BLD AUTO: 0.02 THOUSANDS/ÂΜL (ref 0–0.1)
BASOPHILS NFR BLD AUTO: 0 % (ref 0–1)
BILIRUB DIRECT SERPL-MCNC: 0.21 MG/DL (ref 0–0.2)
BILIRUB SERPL-MCNC: 1.23 MG/DL (ref 0.2–1)
BUN SERPL-MCNC: 19 MG/DL (ref 5–25)
CALCIUM SERPL-MCNC: 7.9 MG/DL (ref 8.4–10.2)
CHLORIDE SERPL-SCNC: 108 MMOL/L (ref 96–108)
CO2 SERPL-SCNC: 25 MMOL/L (ref 21–32)
CREAT SERPL-MCNC: 0.87 MG/DL (ref 0.6–1.3)
EOSINOPHIL # BLD AUTO: 0.09 THOUSAND/ÂΜL (ref 0–0.61)
EOSINOPHIL NFR BLD AUTO: 2 % (ref 0–6)
ERYTHROCYTE [DISTWIDTH] IN BLOOD BY AUTOMATED COUNT: 12.4 % (ref 11.6–15.1)
GFR SERPL CREATININE-BSD FRML MDRD: 86 ML/MIN/1.73SQ M
GLUCOSE P FAST SERPL-MCNC: 99 MG/DL (ref 65–99)
GLUCOSE SERPL-MCNC: 99 MG/DL (ref 65–140)
HCT VFR BLD AUTO: 39.9 % (ref 36.5–49.3)
HGB BLD-MCNC: 13.6 G/DL (ref 12–17)
IMM GRANULOCYTES # BLD AUTO: 0.02 THOUSAND/UL (ref 0–0.2)
IMM GRANULOCYTES NFR BLD AUTO: 0 % (ref 0–2)
LYMPHOCYTES # BLD AUTO: 1.13 THOUSANDS/ÂΜL (ref 0.6–4.47)
LYMPHOCYTES NFR BLD AUTO: 22 % (ref 14–44)
MCH RBC QN AUTO: 30.6 PG (ref 26.8–34.3)
MCHC RBC AUTO-ENTMCNC: 34.1 G/DL (ref 31.4–37.4)
MCV RBC AUTO: 90 FL (ref 82–98)
MONOCYTES # BLD AUTO: 0.74 THOUSAND/ÂΜL (ref 0.17–1.22)
MONOCYTES NFR BLD AUTO: 15 % (ref 4–12)
NEUTROPHILS # BLD AUTO: 3.04 THOUSANDS/ÂΜL (ref 1.85–7.62)
NEUTS SEG NFR BLD AUTO: 61 % (ref 43–75)
NRBC BLD AUTO-RTO: 0 /100 WBCS
PLATELET # BLD AUTO: 129 THOUSANDS/UL (ref 149–390)
PMV BLD AUTO: 9.6 FL (ref 8.9–12.7)
POTASSIUM SERPL-SCNC: 3.6 MMOL/L (ref 3.5–5.3)
PROT SERPL-MCNC: 6.1 G/DL (ref 6.4–8.4)
RBC # BLD AUTO: 4.45 MILLION/UL (ref 3.88–5.62)
SODIUM SERPL-SCNC: 140 MMOL/L (ref 135–147)
WBC # BLD AUTO: 5.04 THOUSAND/UL (ref 4.31–10.16)

## 2025-04-05 PROCEDURE — 99239 HOSP IP/OBS DSCHRG MGMT >30: CPT | Performed by: FAMILY MEDICINE

## 2025-04-05 PROCEDURE — 85025 COMPLETE CBC W/AUTO DIFF WBC: CPT | Performed by: INTERNAL MEDICINE

## 2025-04-05 PROCEDURE — 80048 BASIC METABOLIC PNL TOTAL CA: CPT | Performed by: INTERNAL MEDICINE

## 2025-04-05 PROCEDURE — 80076 HEPATIC FUNCTION PANEL: CPT | Performed by: INTERNAL MEDICINE

## 2025-04-05 RX ORDER — TRAMADOL HYDROCHLORIDE 50 MG/1
50 TABLET ORAL EVERY 6 HOURS PRN
Status: DISCONTINUED | OUTPATIENT
Start: 2025-04-05 | End: 2025-04-05 | Stop reason: HOSPADM

## 2025-04-05 RX ORDER — HYDROCHLOROTHIAZIDE 25 MG/1
25 TABLET ORAL DAILY
Status: DISCONTINUED | OUTPATIENT
Start: 2025-04-05 | End: 2025-04-05 | Stop reason: HOSPADM

## 2025-04-05 RX ORDER — ASPIRIN 81 MG/1
81 TABLET ORAL DAILY
Status: DISCONTINUED | OUTPATIENT
Start: 2025-04-05 | End: 2025-04-05 | Stop reason: HOSPADM

## 2025-04-05 RX ORDER — LISINOPRIL 5 MG/1
5 TABLET ORAL DAILY
Status: DISCONTINUED | OUTPATIENT
Start: 2025-04-05 | End: 2025-04-05 | Stop reason: HOSPADM

## 2025-04-05 RX ORDER — POTASSIUM CHLORIDE 1500 MG/1
20 TABLET, EXTENDED RELEASE ORAL DAILY
Qty: 4 TABLET | Refills: 0 | Status: SHIPPED | OUTPATIENT
Start: 2025-04-05 | End: 2025-04-09

## 2025-04-05 RX ORDER — CARVEDILOL 6.25 MG/1
6.25 TABLET ORAL 2 TIMES DAILY WITH MEALS
Status: DISCONTINUED | OUTPATIENT
Start: 2025-04-05 | End: 2025-04-05 | Stop reason: HOSPADM

## 2025-04-05 RX ORDER — ATORVASTATIN CALCIUM 40 MG/1
40 TABLET, FILM COATED ORAL EVERY EVENING
Status: DISCONTINUED | OUTPATIENT
Start: 2025-04-05 | End: 2025-04-05 | Stop reason: HOSPADM

## 2025-04-05 RX ADMIN — LISINOPRIL 5 MG: 5 TABLET ORAL at 08:34

## 2025-04-05 RX ADMIN — HYDROCHLOROTHIAZIDE 25 MG: 25 TABLET ORAL at 08:34

## 2025-04-05 RX ADMIN — ASPIRIN 81 MG: 81 TABLET, COATED ORAL at 08:34

## 2025-04-05 RX ADMIN — CARVEDILOL 6.25 MG: 6.25 TABLET, FILM COATED ORAL at 08:34

## 2025-04-05 NOTE — PLAN OF CARE
Problem: Prexisting or High Potential for Compromised Skin Integrity  Goal: Skin integrity is maintained or improved  Description: INTERVENTIONS:- Identify patients at risk for skin breakdown- Assess and monitor skin integrity- Assess and monitor nutrition and hydration status- Monitor labs - Assess for incontinence - Turn and reposition patient- Assist with mobility/ambulation- Relieve pressure over bony prominences- Avoid friction and shearing- Provide appropriate hygiene as needed including keeping skin clean and dry- Evaluate need for skin moisturizer/barrier cream- Collaborate with interdisciplinary team - Patient/family teaching- Consider wound care consult   Outcome: Progressing

## 2025-04-05 NOTE — PLAN OF CARE
Problem: Potential for Falls  Goal: Patient will remain free of falls  Description: INTERVENTIONS:- Educate patient/family on patient safety including physical limitations- Instruct patient to call for assistance with activity - Consult OT/PT to assist with strengthening/mobility - Keep Call bell within reach- Keep bed low and locked with side rails adjusted as appropriate- Keep care items and personal belongings within reach- Initiate and maintain comfort rounds- Make Fall Risk Sign visible to staff- Offer Toileting every 2 Hours, in advance of need- Initiate/Maintain bed alarm- Obtain necessary fall risk management equipment: bed in lowest position, call bell within reach- Apply yellow socks and bracelet for high fall risk patients- Consider moving patient to room near nurses station  Outcome: Progressing

## 2025-04-05 NOTE — CASE MANAGEMENT
Case Management Discharge Planning Note    Patient name Lakhwinder Brown  Location /-01 MRN [id number]  : 1955 Date 2025       Current Admission Date: 2025  Current Admission Diagnosis:Gastroenteritis   Patient Active Problem List    Diagnosis Date Noted Date Diagnosed    Gastroenteritis 2025     Mixed hyperlipidemia 2025     S/p mitral valve repair () 2025     Primary hypertension 2019     Chest pain 2019     Hypokalemia 2019       LOS (days): 0  Geometric Mean LOS (GMLOS) (days):   Days to GMLOS:     OBJECTIVE:            Current admission status: Observation   Preferred Pharmacy:   [id number] - [place] - [address]  [address]  [place] [address]  Phone: [phone] Fax: [phone]    Primary Care Provider: Marie Laureano MD    Primary Insurance: [employer]  Secondary Insurance:     DISCHARGE DETAILS:                                          Other Referral/Resources/Interventions Provided:  Referral Comments: Spoke with pt and his wife to understand if there was any difficulty in their lives that case mamagement could asit with . Both stated no that they cannot think of anything that they needed assistance with.

## 2025-04-05 NOTE — UTILIZATION REVIEW
NOTIFICATION OF INPATIENT ADMISSION   AUTHORIZATION REQUEST   SERVICING FACILITY:   [place] [address]  Tax ID: [id number]  NPI: [id number] ATTENDING PROVIDER:  Attending Name and NPI#: Mihir Coles Md [[id number]]  Address: [address] [place] [address]  Phone: [phone]     ADMISSION INFORMATION:  Place of Service: Inpatient [facility]  Place of Service Code: 21  Inpatient Admission Date/Time: N/A N/A  Discharge Date/Time: No discharge date for patient encounter.  Admitting Diagnosis Code/Description:  Chest pain, unspecified [R07.9]  Diarrhea [R19.7]  Hypokalemia [E87.6]  Hypomagnesemia [E83.42]  Prolonged Q-T interval on ECG [R94.31]  Abdominal cramping [R10.9]     UTILIZATION REVIEW CONTACT:  Teresa Pickett, Utilization   Network Utilization Review Department  Phone: [phone]  Fax [phone]  Email: [email or web address]  Contact for approvals/pending authorizations, clinical reviews, and discharge.     PHYSICIAN ADVISORY SERVICES:  Medical Necessity Denial & Aulv-or-Nzrf Review  Phone: [phone]  Fax: [phone]  Email: [email or web address]     DISCHARGE SUPPORT TEAM:  For Patients Discharge Needs & Updates  Phone: [phone] opt. 2 Fax: [phone]  Email: [email or web address]       No restrictions

## 2025-04-05 NOTE — DISCHARGE SUMMARY
"Discharge Summary - Hospitalist   Name: Lakhwinder Brown 70 y.o. male I MRN: [id number]  Unit/Bed#: -01 I Date of Admission: 4/4/2025   Date of Service: 4/5/2025 I Hospital Day: 0     Assessment & Plan  Gastroenteritis  Improving symptoms  Diarrhea has resolved abdominal pain has improved significantly  Is tolerating diet without any problems  This is likely viral gastroenteritis  Hypokalemia  Resolved  Primary hypertension  /81   Pulse 57   Temp 98.4 °F (36.9 °C)   Resp 18   Ht 5' 7\" (1.702 m) Comment: stated  Wt 91.9 kg (202 lb 9.6 oz)   SpO2 96%   BMI 31.73 kg/m²   Continue home medications at discharge  Mixed hyperlipidemia  Continue statin  S/p mitral valve repair (2009)  History of mitral valve repair  OP echo      Medical Problems       Resolved Problems  Date Reviewed: 4/5/2025   None       Discharging Physician / Practitioner: Mihir Coles MD  PCP: Marie Laureano MD  Admission Date:   Admission Orders (From admission, onward)       Ordered        04/04/25 1825  Place in Observation  Once                          Discharge Date: 04/05/25    Consultations During Hospital Stay:  IP CONSULT TO CASE MANAGEMENT    Procedures Performed:   none    Significant Findings / Test Results:    As above    Incidental Findings:   none     Test Results Pending at Discharge (will require follow up):   none     Outpatient Tests Requested:  none    Reason for Admission: \"Lakhwinder Brown is a 70 y.o. male with past medical history significant hypertension, hyperlipidemia initially presented with lower abdominal pain and diarrhea.  Report symptoms for last couple days.  Symptoms are very slowly improving.  Otherwise denies any acute complaints.  Denies fevers, chills.  Denies chest pain, cough, nausea, palpitations, dysuria or any other complaints \"    Hospital Course:   Lakhwinder Brown is a 70 y.o. male patient who originally presented to the hospital on 4/4/2025 due to diarrhea and electrolyte disturbance.  " "Likely secondary to viral gastroenteritis.  Diarrhea has now completely resolved.  Electrolytes are now stable.  He is tolerating diet.  He is cleared for discharge with outpatient follow-up.  Sending him with 3 days of p.o. potassium and recommend outpatient follow-up with PCP and get a BMP    Please see above list of diagnoses and related plan for additional information.     Condition at Discharge: stable    Discharge Day Visit / Exam:   Subjective:  \" I am feeling better, ready to go home\"  Vitals: Blood Pressure: 132/81 (04/05/25 1101)  Pulse: 57 (04/05/25 1101)  Temperature: 98.4 °F (36.9 °C) (04/05/25 1101)  Temp Source: Oral (04/04/25 2033)  Respirations: 18 (04/05/25 1101)  Height: 5' 7\" (170.2 cm) (stated) (04/04/25 2032)  Weight - Scale: 91.9 kg (202 lb 9.6 oz) (04/04/25 1135)  SpO2: 96 % (04/05/25 1101)  Physical Exam General- Awake, alert and oriented x 3, looks comfortable  HEENT- Normocephalic, atraumatic, oral mucosa- moist  Neck- Supple, No carotid bruit, no JVD  CVS- Normal S1/ S2, Regular rate and rhythm, No murmur, No edema  Respiratory system- B/L clear breath sounds, no wheezing  Abdomen- Soft, Non distended, mild abdominal tenderness, Bowel sound- present 4 quads  Genitourinary- No suprapubic tenderness, No CVA tenderness  Skin- No new bruise or rash  Musculoskeletal- No gross deformity  Psych- No acute psychosis  CNS- CN II- XII grossly intact, No acute focal neurologic deficit noted      Discussion with Family: Updated  (wife) at bedside.    Discharge instructions/Information to patient and family:   See after visit summary for information provided to patient and family.      Provisions for Follow-Up Care:  See after visit summary for information related to follow-up care and any pertinent home health orders.      Mobility at time of Discharge:   Basic Mobility Inpatient Raw Score: 19  JH-HLM Goal: 6: Walk 10 steps or more  JH-HLM Achieved: 6: Walk 10 steps or more  HLM Goal " achieved. Continue to encourage appropriate mobility.     Disposition:   Home    Planned Readmission: no    Discharge Medications:  See after visit summary for reconciled discharge medications provided to patient and/or family.      Administrative Statements   Discharge Statement:  I have spent a total time of 75 minutes in caring for this patient on the day of the visit/encounter. >30 minutes of time was spent on: Diagnostic results, Prognosis, Risks and benefits of tx options, Instructions for management, Patient and family education, Importance of tx compliance, Risk factor reductions, Impressions, Counseling / Coordination of care, Documenting in the medical record, Reviewing / ordering tests, medicine, procedures  , and Communicating with other healthcare professionals .    **Please Note: This note may have been constructed using a voice recognition system**

## 2025-04-05 NOTE — ASSESSMENT & PLAN NOTE
"/81   Pulse 57   Temp 98.4 °F (36.9 °C)   Resp 18   Ht 5' 7\" (1.702 m) Comment: stated  Wt 91.9 kg (202 lb 9.6 oz)   SpO2 96%   BMI 31.73 kg/m²   Continue home medications at discharge  "

## 2025-04-05 NOTE — ASSESSMENT & PLAN NOTE
Improving symptoms  Diarrhea has resolved abdominal pain has improved significantly  Is tolerating diet without any problems  This is likely viral gastroenteritis

## 2025-04-05 NOTE — ED PROVIDER NOTES
"Time reflects when diagnosis was documented in both MDM as applicable and the Disposition within this note       Time User Action Codes Description Comment    4/4/2025  6:25 PM John Mott [R19.7] Diarrhea     4/4/2025  6:25 PM John Mott [R10.9] Abdominal cramping     4/4/2025  6:25 PM John Mott [E83.42] Hypomagnesemia     4/4/2025  6:25 PM John Mott [E87.6] Hypokalemia     4/4/2025  6:25 PM John Mott [R94.31] Prolonged Q-T interval on ECG     4/4/2025  6:25 PM John Mott [R07.9] Chest pain, unspecified     4/4/2025  8:26 PM Myrna Diaz Add [Z13.9] Encounter for screening involving social determinants of health (SDoH)           ED Disposition       ED Disposition   Admit    Condition   Stable    Date/Time   Fri Apr 4, 2025  6:25 PM    Comment   Admit to [facility]               Assessment & Plan       Medical Decision Making  Patient is a 70 year old male with PMHx HTN, HLD, presenting to the Ed for evaluation of 2 day history multiple episodes of\"explosive\" watery diarrhea, associated with lower abdominal pain. Patient also noting experiencing some transient chest tightness last night with a transient episode of shortness of breath that has not recurred today. Patient states that he has no appetite and states that whenever he eats he gets very nauseous, but no vomiting. Feels that as soon as he eats he has to use the bathroom. Patient denies any history of recent travel, recent antibiotic use, recent hospitalization, exposure to anyone confirmed with C.Diff infection. Patient denies any fevers, chills, urinary complaints, peripheral edema.    Ddx: colitis, dehydration, gastroenteritis, electrolyte disturbance, ACS, pulmonary abnormality. Doubt infectious diarrhea based on absence of risk factors.    Plan: Labs, troponin, EKG, CXR CT AP. IVF, pain control.    Workup showing negative delta troponin, no ischemic changes on EKG, normal CXR.Workup showing hypokalemia and " hypomagnesemia. Repleted MG IV and K+ PO. CT AP negative for acute pathology. Patient states that his symptoms have improved. No bowel movement in the ED. Repeat BMP shows marginal improvement of K+. However, on repeat EKG, patient has lengthening of Qtc. In context of hypokalemia, advised patient that he should remain in hospital for telemetry monitoring and K+ replacement. Patient is agreeable with the plan. Case discussed with MARTHA; arrangements made for admission.    Amount and/or Complexity of Data Reviewed  Labs: ordered. Decision-making details documented in ED Course.  Radiology: ordered and independent interpretation performed.    Risk  Prescription drug management.  Decision regarding hospitalization.        ED Course as of 04/04/25 2141 Fri Apr 04, 2025   1359 CXR negative for acute cardiopulmonary disease as interpreted by me     1412 EKG: SB at 59 bpm, normal axis, normal intervals, no acute ischemic changes as interpreted by me     1413 MAGNESIUM(!): 1.8  Will replete IV   1413 Potassium(!): 2.9  Replete PO     1434 hs TnI 0hr: 10  Will follow with delta troponin     1453 IMPRESSION:  No acute intra-abdominal pathology.  Scattered uncomplicated colonic diverticula.  Cholelithiasis.  Other findings, as per the body of the report.     1651 Delta 2hr hsTnI: -2   1726 Potassium(!): 3.0  Marginally improved; expected to improve with prior administration of K-Dur. Patient encouraged to eat potassium rich foods at home     1750 Repeat EKG; SB at 58 bpm, normal axis, Qtc 516, no acute ischemic changes as interpreted by me.    Qtc lengthened compared to prior EKG. Discussed with patient and given presence of hypokalemia and now lengthened QT interval, advised admission to hospital for further evaluation, K+ replacement, and telemetry monitoring. Patient is agreeable.       Medications   acetaminophen (TYLENOL) tablet 650 mg (has no administration in time range)   sodium chloride 0.9 % bolus 1,000 mL (0 mL  Intravenous Stopped 4/4/25 1617)   magnesium sulfate 2 g/50 mL IVPB (premix) 2 g (0 g Intravenous Stopped 4/4/25 1617)   potassium chloride (Klor-Con M20) CR tablet 60 mEq (60 mEq Oral Given 4/4/25 1439)   iohexol (OMNIPAQUE) 350 MG/ML injection (MULTI-DOSE) 100 mL (100 mL Intravenous Given 4/4/25 1420)   ondansetron (ZOFRAN) injection 4 mg (4 mg Intravenous Given 4/4/25 1440)   morphine injection 4 mg (4 mg Intravenous Given 4/4/25 1440)   potassium chloride (Klor-Con M20) CR tablet 40 mEq (40 mEq Oral Given 4/4/25 1837)       ED Risk Strat Scores   HEART Risk Score      Flowsheet Row Most Recent Value   Heart Score Risk Calculator    History 0 Filed at: 04/04/2025 2139   ECG 0 Filed at: 04/04/2025 2139   Age 2 Filed at: 04/04/2025 2139   Risk Factors 1 Filed at: 04/04/2025 2139   Troponin 0 Filed at: 04/04/2025 2139   HEART Score 3 Filed at: 04/04/2025 2139          HEART Risk Score      Flowsheet Row Most Recent Value   Heart Score Risk Calculator    History 0 Filed at: 04/04/2025 2139   ECG 0 Filed at: 04/04/2025 2139   Age 2 Filed at: 04/04/2025 2139   Risk Factors 1 Filed at: 04/04/2025 2139   Troponin 0 Filed at: 04/04/2025 2139   HEART Score 3 Filed at: 04/04/2025 2139                              SBIRT 22yo+      Flowsheet Row Most Recent Value   Initial Alcohol Screen: US AUDIT-C     1. How often do you have a drink containing alcohol? 0 Filed at: 04/04/2025 1137   2. How many drinks containing alcohol do you have on a typical day you are drinking?  0 Filed at: 04/04/2025 1137   3b. FEMALE Any Age, or MALE 65+: How often do you have 4 or more drinks on one occassion? 0 Filed at: 04/04/2025 1137   Audit-C Score 0 Filed at: 04/04/2025 1137   JESÚS: How many times in the past year have you...    Used an illegal drug or used a prescription medication for non-medical reasons? Never Filed at: 04/04/2025 7644                            History of Present Illness       Chief Complaint   Patient presents with     "Diarrhea     Reports explosive diarrhea and mid abdominal pain x couple days. Denies n/v, fevers.       Past Medical History:   Diagnosis Date    Hypertension       Past Surgical History:   Procedure Laterality Date    CARDIAC SURGERY      \"repaired 3 valves\" 2009    HERNIA REPAIR        History reviewed. No pertinent family history.   Social History     Tobacco Use    Smoking status: Never    Smokeless tobacco: Never   Vaping Use    Vaping status: Never Used   Substance Use Topics    Alcohol use: Never    Drug use: Never      E-Cigarette/Vaping    E-Cigarette Use Never User       E-Cigarette/Vaping Substances      I have reviewed and agree with the history as documented.     HPI    Patient is a 70 year old male with PMHx HTN, HLD, presenting to the Ed for evaluation of 2 day history multiple episodes of\"explosive\" watery diarrhea, associated with lower abdominal pain. Patient also noting experiencing some transient chest tightness last night with a transient episode of shortness of breath that has not recurred today. Patient states that he has no appetite and states that whenever he eats he gets very nauseous, but no vomiting. Feels that as soon as he eats he has to use the bathroom. Patient denies any history of recent travel, recent antibiotic use, recent hospitalization, exposure to anyone confirmed with C.Diff infection. Patient denies any fevers, chills, urinary complaints, peripheral edema.    Review of Systems   All other systems reviewed and are negative.          Objective       ED Triage Vitals   Temperature Pulse Blood Pressure Respirations SpO2 Patient Position - Orthostatic VS   04/04/25 1135 04/04/25 1135 04/04/25 1135 04/04/25 1135 04/04/25 1135 04/04/25 1135   98.1 °F (36.7 °C) 73 125/78 18 99 % Sitting      Temp Source Heart Rate Source BP Location FiO2 (%) Pain Score    04/04/25 1135 04/04/25 1135 04/04/25 1135 -- 04/04/25 1440    Temporal Monitor Left arm  8      Vitals      Date and Time Temp " Pulse SpO2 Resp BP Pain Score FACES Pain Rating User   04/04/25 2033 97.6 °F (36.4 °C) -- 98 % -- -- -- -- VS   04/04/25 2032 -- -- -- -- -- 6 -- VS   04/04/25 2006 97.6 °F (36.4 °C) 56 99 % -- 135/77 -- -- DII   04/04/25 1600 -- 56 94 % 13 102/63 -- -- AM   04/04/25 1530 -- 57 96 % 14 107/62 -- -- KALI   04/04/25 1510 -- -- -- -- -- 6 -- AM   04/04/25 1500 -- 58 94 % 14 107/64 -- -- AM   04/04/25 1440 -- -- -- -- -- 8 -- AM   04/04/25 1430 -- 60 99 % 16 115/65 -- -- AM   04/04/25 1410 -- 58 99 % 12 119/71 -- -- AM   04/04/25 1135 98.1 °F (36.7 °C) 73 99 % 18 125/78 -- -- BS            Physical Exam  Vitals and nursing note reviewed.   Constitutional:       General: He is not in acute distress.     Appearance: Normal appearance. He is well-developed and normal weight. He is not ill-appearing, toxic-appearing or diaphoretic.   HENT:      Head: Normocephalic and atraumatic.      Right Ear: External ear normal.      Left Ear: External ear normal.      Nose: Nose normal.      Mouth/Throat:      Mouth: Mucous membranes are moist.      Pharynx: Oropharynx is clear.   Eyes:      Conjunctiva/sclera: Conjunctivae normal.   Cardiovascular:      Rate and Rhythm: Normal rate and regular rhythm.      Pulses: Normal pulses.      Heart sounds: No murmur heard.  Pulmonary:      Effort: Pulmonary effort is normal. No respiratory distress.      Breath sounds: Normal breath sounds. No wheezing, rhonchi or rales.   Chest:      Chest wall: No tenderness.   Abdominal:      General: Abdomen is flat.      Palpations: Abdomen is soft. There is no mass.      Tenderness: There is abdominal tenderness (lower abdominal). There is no guarding or rebound.   Musculoskeletal:         General: No swelling. Normal range of motion.      Cervical back: Normal range of motion and neck supple.      Right lower leg: No edema.      Left lower leg: No edema.   Skin:     General: Skin is warm and dry.      Capillary Refill: Capillary refill takes less than 2  seconds.      Findings: No erythema.   Neurological:      General: No focal deficit present.      Mental Status: He is alert and oriented to person, place, and time.      Cranial Nerves: No cranial nerve deficit.      Sensory: No sensory deficit.      Motor: No weakness.   Psychiatric:         Mood and Affect: Mood normal.         Results Reviewed       Procedure Component Value Units Date/Time    Stool Enteric Bacterial Panel by PCR [[id number]]     Lab Status: No result Specimen: Stool     Clostridioides difficile toxin by PCR with EIA [[id number]]     Lab Status: No result Specimen: Stool from Per Rectum     HS Troponin I 4hr [[id number]]  (Normal) Collected: 04/04/25 1838    Lab Status: Final result Specimen: Blood from Arm, Right Updated: 04/04/25 1906     hs TnI 4hr 7 ng/L      Delta 4hr hsTnI -3 ng/L     Potassium [[id number]]  (Abnormal) Collected: 04/04/25 1700    Lab Status: Final result Specimen: Blood from Arm, Right Updated: 04/04/25 1722     Potassium 3.0 mmol/L     HS Troponin I 2hr [[id number]]  (Normal) Collected: 04/04/25 1617    Lab Status: Final result Specimen: Blood from Arm, Right Updated: 04/04/25 1648     hs TnI 2hr 8 ng/L      Delta 2hr hsTnI -2 ng/L     HS Troponin 0hr (reflex protocol) [[id number]]  (Normal) Collected: 04/04/25 1407    Lab Status: Final result Specimen: Blood from Arm, Right Updated: 04/04/25 1433     hs TnI 0hr 10 ng/L     Comprehensive metabolic panel [[id number]]  (Abnormal) Collected: 04/04/25 1330    Lab Status: Final result Specimen: Blood from Arm, Right Updated: 04/04/25 1404     Sodium 141 mmol/L      Potassium 2.9 mmol/L      Chloride 107 mmol/L      CO2 27 mmol/L      ANION GAP 7 mmol/L      BUN 22 mg/dL      Creatinine 0.89 mg/dL      Glucose 97 mg/dL      Calcium 8.3 mg/dL      AST 27 U/L      ALT 20 U/L      Alkaline Phosphatase 42 U/L      Total Protein 6.4 g/dL      Albumin 3.9 g/dL      Total Bilirubin 1.17 mg/dL      eGFR 86 ml/min/1.73sq m     Narrative:       National Kid[employer] Foundation guidelines for Chronic Kidney Disease (CKD):     Stage 1 with normal or high GFR (GFR > 90 mL/min/1.73 square meters)    Stage 2 Mild CKD (GFR = 60-89 mL/min/1.73 square meters)    Stage 3A Moderate CKD (GFR = 45-59 mL/min/1.73 square meters)    Stage 3B Moderate CKD (GFR = 30-44 mL/min/1.73 square meters)    Stage 4 Severe CKD (GFR = 15-29 mL/min/1.73 square meters)    Stage 5 End Stage CKD (GFR <15 mL/min/1.73 square meters)  Note: GFR calculation is accurate only with a steady state creatinine    Magnesium [[id number]]  (Abnormal) Collected: 04/04/25 1330    Lab Status: Final result Specimen: Blood from Arm, Right Updated: 04/04/25 1404     Magnesium 1.8 mg/dL     Lipase [[id number]]  (Normal) Collected: 04/04/25 1330    Lab Status: Final result Specimen: Blood from Arm, Right Updated: 04/04/25 1404     Lipase 23 u/L     CBC and differential [[id number]]  (Abnormal) Collected: 04/04/25 1330    Lab Status: Final result Specimen: Blood from Arm, Right Updated: 04/04/25 1354     WBC 8.71 Thousand/uL      RBC 5.03 Million/uL      Hemoglobin 15.4 g/dL      Hematocrit 44.4 %      MCV 88 fL      MCH 30.6 pg      MCHC 34.7 g/dL      RDW 12.4 %      MPV 9.6 fL      Platelets 146 Thousands/uL      nRBC 0 /100 WBCs      Segmented % 72 %      Immature Grans % 0 %      Lymphocytes % 13 %      Monocytes % 14 %      Eosinophils Relative 1 %      Basophils Relative 0 %      Absolute Neutrophils 6.27 Thousands/µL      Absolute Immature Grans 0.03 Thousand/uL      Absolute Lymphocytes 1.11 Thousands/µL      Absolute Monocytes 1.25 Thousand/µL      Eosinophils Absolute 0.04 Thousand/µL      Basophils Absolute 0.01 Thousands/µL             CT abdomen pelvis with contrast   Final Interpretation by Carolyn Castellano MD (04/04 1450)   No acute intra-abdominal pathology.   Scattered uncomplicated colonic diverticula.   Cholelithiasis.   Other findings, as per the body of the report.            Workstation  performed: [id number]         XR chest 2 views   ED Interpretation by John Mott DO (04/04 4622)   NO acute cardiopulmonary disease as interpreted by me        Final Interpretation by John Manuel MD (04/04 8503)      No acute cardiopulmonary disease.            Workstation performed: [id number]             Procedures    ED Medication and Procedure Management   Prior to Admission Medications   Prescriptions Last Dose Informant Patient Reported? Taking?   Aspirin Low Dose 81 MG EC tablet 4/4/2025 Morning Self No Yes   Sig: TAKE 1 TABLET BY MOUTH EVERY DAY   acetaminophen (TYLENOL) 325 mg tablet 4/3/2025 Self No Yes   Sig: Take 2 tablets (650 mg total) by mouth every 6 (six) hours as needed for mild pain, headaches or fever   atorvastatin (LIPITOR) 40 mg tablet 4/3/2025 Evening  No Yes   Sig: Take 1 tablet (40 mg total) by mouth every evening   carvedilol (COREG) 6.25 mg tablet 4/4/2025 Morning Self No Yes   Sig: Take 1 tablet (6.25 mg total) by mouth 2 (two) times a day with meals   hydroCHLOROthiazide 25 mg tablet 4/4/2025 Morning  No Yes   Sig: Take 1 tablet (25 mg total) by mouth daily   lisinopril (ZESTRIL) 5 mg tablet 4/3/2025 Evening  No Yes   Sig: Take 1 tablet (5 mg total) by mouth daily   naproxen (NAPROSYN) 500 mg tablet Past Week Self No Yes   Sig: Take 1 tablet (500 mg total) by mouth 2 (two) times a day as needed for mild pain   traMADol (ULTRAM) 50 mg tablet Not Taking Self Yes No   Sig: Take 50 mg by mouth every 6 (six) hours as needed for moderate pain   Patient not taking: Reported on 4/4/2025      Facility-Administered Medications: None     Current Discharge Medication List        CONTINUE these medications which have NOT CHANGED    Details   acetaminophen (TYLENOL) 325 mg tablet Take 2 tablets (650 mg total) by mouth every 6 (six) hours as needed for mild pain, headaches or fever  Qty: 30 tablet, Refills: 0    Associated Diagnoses: Chest pain, unspecified type      Aspirin Low Dose 81  MG EC tablet TAKE 1 TABLET BY MOUTH EVERY DAY  Qty: 90 tablet, Refills: 3    Associated Diagnoses: Essential hypertension      atorvastatin (LIPITOR) 40 mg tablet Take 1 tablet (40 mg total) by mouth every evening  Qty: 90 tablet, Refills: 3    Associated Diagnoses: Primary hypertension      carvedilol (COREG) 6.25 mg tablet Take 1 tablet (6.25 mg total) by mouth 2 (two) times a day with meals  Qty: 60 tablet, Refills: 4    Associated Diagnoses: Essential hypertension      hydroCHLOROthiazide 25 mg tablet Take 1 tablet (25 mg total) by mouth daily  Qty: 90 tablet, Refills: 3    Associated Diagnoses: Primary hypertension      lisinopril (ZESTRIL) 5 mg tablet Take 1 tablet (5 mg total) by mouth daily  Qty: 90 tablet, Refills: 3    Associated Diagnoses: Primary hypertension      naproxen (NAPROSYN) 500 mg tablet Take 1 tablet (500 mg total) by mouth 2 (two) times a day as needed for mild pain  Qty: 20 tablet, Refills: 0    Associated Diagnoses: Atypical chest pain      traMADol (ULTRAM) 50 mg tablet Take 50 mg by mouth every 6 (six) hours as needed for moderate pain           No discharge procedures on file.  ED SEPSIS DOCUMENTATION   Time reflects when diagnosis was documented in both MDM as applicable and the Disposition within this note       Time User Action Codes Description Comment    4/4/2025  6:25 PM John Mott [R19.7] Diarrhea     4/4/2025  6:25 PM John Mott [R10.9] Abdominal cramping     4/4/2025  6:25 PM John Mott [E83.42] Hypomagnesemia     4/4/2025  6:25 PM John Mott [E87.6] Hypokalemia     4/4/2025  6:25 PM John Mott [R94.31] Prolonged Q-T interval on ECG     4/4/2025  6:25 PM John Mott [R07.9] Chest pain, unspecified     4/4/2025  8:26 PM Myrna Diaz [Z13.9] Encounter for screening involving social determinants of health (SDoH)                  John Mott DO  04/04/25 1993

## 2025-04-06 NOTE — UTILIZATION REVIEW
NOTIFICATION OF ADMISSION DISCHARGE   This is a Notification of Discharge from [facility]. Please be advised that this patient has been discharge from our facility. Below you will find the admission and discharge date and time including the patient’s disposition.   UTILIZATION REVIEW CONTACT:  Utilization Review Assistants  Network Utilization Review Department  Phone: [phone] x carefully listen to the prompts. All voicemails are confidential.  Email: [email or web address]     ADMISSION INFORMATION  PRESENTATION DATE: 4/4/2025  1:19 PM  OBERVATION ADMISSION DATE: 04/04/2025 1825  INPATIENT ADMISSION DATE: N/A N/A   DISCHARGE DATE: 4/5/2025  1:28 PM   DISPOSITION:Home/Self Care    Network Utilization Review Department  ATTENTION: Please call with any questions or concerns to [phone] and carefully listen to the prompts so that you are directed to the right person. All voicemails are confidential.   For Discharge needs, contact Care Management DC Support Team at [phone] opt. 2  Send all requests for admission clinical reviews, approved or denied determinations and any other requests to dedicated fax number below belonging to the campus where the patient is receiving treatment. List of dedicated fax numbers for the Facilities:  FACILITY NAME UR FAX NUMBER   ADMISSION DENIALS (Administrative/Medical Necessity) [phone]   DISCHARGE SUPPORT TEAM ([facility]) [phone]   PARENT CHILD HEALTH (Maternity/NICU/Pediatrics) [phone]   [facility] [phone]   [facility] [phone]   [facility] [phone]   [facility] [phone]   [facility] [phone]   [facility] [phone]   [facility] [phone]   [facility] [phone]   [facility]  [facility] [phone]   [facility] [phone]   [facility] [phone]   [facility] [phone]

## 2025-04-07 LAB
ATRIAL RATE: 58 BPM
P AXIS: 41 DEGREES
PR INTERVAL: 204 MS
QRS AXIS: 14 DEGREES
QRSD INTERVAL: 96 MS
QT INTERVAL: 526 MS
QTC INTERVAL: 516 MS
T WAVE AXIS: 19 DEGREES
VENTRICULAR RATE: 58 BPM

## 2025-04-07 PROCEDURE — 93010 ELECTROCARDIOGRAM REPORT: CPT | Performed by: INTERNAL MEDICINE

## 2025-04-09 ENCOUNTER — HOSPITAL ENCOUNTER (OUTPATIENT)
Dept: NON INVASIVE DIAGNOSTICS | Facility: CLINIC | Age: 70
Discharge: HOME | End: 2025-04-09
Payer: COMMERCIAL

## 2025-04-09 ENCOUNTER — TELEPHONE (OUTPATIENT)
Dept: CARDIOLOGY CLINIC | Facility: CLINIC | Age: 70
End: 2025-04-09

## 2025-04-09 ENCOUNTER — RESULTS FOLLOW-UP (OUTPATIENT)
Dept: NON INVASIVE DIAGNOSTICS | Facility: HOSPITAL | Age: 70
End: 2025-04-09

## 2025-04-09 VITALS
HEIGHT: 67 IN | HEART RATE: 60 BPM | DIASTOLIC BLOOD PRESSURE: 81 MMHG | BODY MASS INDEX: 31.71 KG/M2 | SYSTOLIC BLOOD PRESSURE: 132 MMHG | WEIGHT: 202 LBS

## 2025-04-09 DIAGNOSIS — I10 ESSENTIAL HYPERTENSION: ICD-10-CM

## 2025-04-09 DIAGNOSIS — I05.9 MITRAL VALVE DISORDER: ICD-10-CM

## 2025-04-09 LAB
AORTIC ROOT: 4 CM
ASCENDING AORTA: 3.7 CM
BSA FOR ECHO PROCEDURE: 2.03 M2
DOP CALC MV VTI: 58.97 CM
E WAVE DECELERATION TIME: 401 MS
E/A RATIO: 1.03
FRACTIONAL SHORTENING: 33 (ref 28–44)
INTERVENTRICULAR SEPTUM IN DIASTOLE (PARASTERNAL SHORT AXIS VIEW): 1.3 CM
INTERVENTRICULAR SEPTUM: 1.3 CM (ref 0.6–1.1)
LAAS-AP2: 22.4 CM2
LAAS-AP4: 20.5 CM2
LEFT ATRIUM SIZE: 4.9 CM
LEFT ATRIUM VOLUME (MOD BIPLANE): 65 ML
LEFT ATRIUM VOLUME INDEX (MOD BIPLANE): 32 ML/M2
LEFT INTERNAL DIMENSION IN SYSTOLE: 3.3 CM (ref 2.1–4)
LEFT VENTRICULAR INTERNAL DIMENSION IN DIASTOLE: 4.9 CM (ref 3.5–6)
LEFT VENTRICULAR POSTERIOR WALL IN END DIASTOLE: 1.4 CM
LEFT VENTRICULAR STROKE VOLUME: 71 ML
LV EF US.2D.A4C+ESTIMATED: 62 %
LVSV (TEICH): 71 ML
MV E'TISSUE VEL-SEP: 7 CM/S
MV MEAN GRADIENT: 3 MMHG
MV PEAK A VEL: 1.23 M/S
MV PEAK E VEL: 127 CM/S
MV PEAK GRADIENT: 9 MMHG
MV STENOSIS PRESSURE HALF TIME: 116 MS
MV VALVE AREA P 1/2 METHOD: 1.9
RIGHT ATRIUM AREA SYSTOLE A4C: 18.9 CM2
RIGHT VENTRICLE ID DIMENSION: 4.1 CM
SL CV LEFT ATRIUM LENGTH A2C: 5.8 CM
SL CV LV EF: 60
SL CV PED ECHO LEFT VENTRICLE DIASTOLIC VOLUME (MOD BIPLANE) 2D: 115 ML
SL CV PED ECHO LEFT VENTRICLE SYSTOLIC VOLUME (MOD BIPLANE) 2D: 44 ML
TR MAX PG: 24 MMHG
TR PEAK VELOCITY: 2.5 M/S
TRICUSPID ANNULAR PLANE SYSTOLIC EXCURSION: 1.6 CM
TRICUSPID VALVE PEAK REGURGITATION VELOCITY: 2.46 M/S

## 2025-04-09 PROCEDURE — 93306 TTE W/DOPPLER COMPLETE: CPT | Performed by: INTERNAL MEDICINE

## 2025-04-09 PROCEDURE — 93306 TTE W/DOPPLER COMPLETE: CPT

## 2025-04-09 NOTE — TELEPHONE ENCOUNTER
Patient was discharged from hospital on 4/5/25 and told to stop taking naproxen and prescribed tramadol per discharge summary. Tramadol was never given to him and he would like to go over medications and what he is supposed to be taking.Please call patient.

## 2025-04-10 RX ORDER — CARVEDILOL 6.25 MG/1
6.25 TABLET ORAL 2 TIMES DAILY WITH MEALS
Qty: 180 TABLET | Refills: 1 | Status: SHIPPED | OUTPATIENT
Start: 2025-04-10

## 2025-04-14 NOTE — TELEPHONE ENCOUNTER
----- Message from Mika Garzon PA-C sent at 4/9/2025  2:20 PM EDT -----  Good afternoon, please call patient to advise echocardiogram shows his mitral valve overall looks stable compared to previous study.  Heart pump function is within normal limits.  His aortic root is mildly dilated.  We will continue periodic surveillance.  These results can be discussed in more detail at next office visit.  Thank you and have a wonderful day.

## 2025-04-15 NOTE — TELEPHONE ENCOUNTER
Pt called back in regard to the pain medication issue. He is aware he is not supposed to take Naproxen and would like to know what he is supposed to take. Will someone prescribe him Tramadol. He does not have a PCP at present. Pt reports all his providers are retiring. He has pain on some days at a 10/10 and needs to use a scooter at work. He is currently taking tylenol 650mg in the am which is not effective and he does not feel is good for him either. Please call him back with a recommendation.    The patients ECHO results were reviewed as well per Mika Garzon's result notes in the chart.